# Patient Record
Sex: FEMALE | Race: WHITE | NOT HISPANIC OR LATINO | Employment: OTHER | URBAN - METROPOLITAN AREA
[De-identification: names, ages, dates, MRNs, and addresses within clinical notes are randomized per-mention and may not be internally consistent; named-entity substitution may affect disease eponyms.]

---

## 2024-10-29 ENCOUNTER — APPOINTMENT (OUTPATIENT)
Dept: CARDIOLOGY | Facility: HOSPITAL | Age: 74
End: 2024-10-29
Payer: COMMERCIAL

## 2024-10-29 ENCOUNTER — APPOINTMENT (OUTPATIENT)
Dept: RADIOLOGY | Facility: HOSPITAL | Age: 74
End: 2024-10-29
Payer: COMMERCIAL

## 2024-10-29 ENCOUNTER — HOSPITAL ENCOUNTER (INPATIENT)
Facility: HOSPITAL | Age: 74
LOS: 3 days | Discharge: NURSING, REHAB OR LONG-TERM CARE | End: 2024-11-01
Attending: EMERGENCY MEDICINE | Admitting: INTERNAL MEDICINE
Payer: COMMERCIAL

## 2024-10-29 DIAGNOSIS — N30.00 ACUTE CYSTITIS WITHOUT HEMATURIA: ICD-10-CM

## 2024-10-29 DIAGNOSIS — J18.9 PNEUMONIA OF RIGHT MIDDLE LOBE DUE TO INFECTIOUS ORGANISM: Primary | ICD-10-CM

## 2024-10-29 DIAGNOSIS — G25.81 RESTLESS LEG SYNDROME: ICD-10-CM

## 2024-10-29 DIAGNOSIS — I50.32 CHRONIC DIASTOLIC HEART FAILURE: ICD-10-CM

## 2024-10-29 PROBLEM — J96.21 ACUTE ON CHRONIC RESPIRATORY FAILURE WITH HYPOXIA AND HYPERCAPNIA (MULTI): Status: ACTIVE | Noted: 2024-10-29

## 2024-10-29 PROBLEM — L03.032 CELLULITIS OF TOE OF LEFT FOOT: Status: ACTIVE | Noted: 2024-10-29

## 2024-10-29 PROBLEM — J96.22 ACUTE ON CHRONIC RESPIRATORY FAILURE WITH HYPOXIA AND HYPERCAPNIA (MULTI): Status: ACTIVE | Noted: 2024-10-29

## 2024-10-29 PROBLEM — N39.0 URINARY TRACT INFECTION: Status: ACTIVE | Noted: 2024-10-29

## 2024-10-29 LAB
ALBUMIN SERPL BCP-MCNC: 4 G/DL (ref 3.4–5)
ALP SERPL-CCNC: 85 U/L (ref 33–136)
ALT SERPL W P-5'-P-CCNC: 11 U/L (ref 7–45)
ANION GAP SERPL CALC-SCNC: 11 MMOL/L (ref 10–20)
APPEARANCE UR: ABNORMAL
AST SERPL W P-5'-P-CCNC: 16 U/L (ref 9–39)
ATRIAL RATE: 95 BPM
BACTERIA #/AREA URNS AUTO: ABNORMAL /HPF
BASE EXCESS BLDV CALC-SCNC: 11.4 MMOL/L (ref -2–3)
BASOPHILS # BLD AUTO: 0.03 X10*3/UL (ref 0–0.1)
BASOPHILS NFR BLD AUTO: 0.4 %
BILIRUB SERPL-MCNC: 0.9 MG/DL (ref 0–1.2)
BILIRUB UR STRIP.AUTO-MCNC: NEGATIVE MG/DL
BNP SERPL-MCNC: 267 PG/ML (ref 0–99)
BODY TEMPERATURE: 37 DEGREES CELSIUS
BUN SERPL-MCNC: 25 MG/DL (ref 6–23)
CALCIUM SERPL-MCNC: 10.1 MG/DL (ref 8.6–10.3)
CARDIAC TROPONIN I PNL SERPL HS: 26 NG/L (ref 0–13)
CARDIAC TROPONIN I PNL SERPL HS: 36 NG/L (ref 0–13)
CHLORIDE SERPL-SCNC: 95 MMOL/L (ref 98–107)
CO2 SERPL-SCNC: 39 MMOL/L (ref 21–32)
COLOR UR: ABNORMAL
CREAT SERPL-MCNC: 1.1 MG/DL (ref 0.5–1.05)
CRP SERPL-MCNC: 6.31 MG/DL
EGFRCR SERPLBLD CKD-EPI 2021: 52 ML/MIN/1.73M*2
EOSINOPHIL # BLD AUTO: 0.11 X10*3/UL (ref 0–0.4)
EOSINOPHIL NFR BLD AUTO: 1.6 %
ERYTHROCYTE [DISTWIDTH] IN BLOOD BY AUTOMATED COUNT: 12.2 % (ref 11.5–14.5)
ERYTHROCYTE [SEDIMENTATION RATE] IN BLOOD BY WESTERGREN METHOD: 51 MM/H (ref 0–30)
EST. AVERAGE GLUCOSE BLD GHB EST-MCNC: 108 MG/DL
FLUAV RNA RESP QL NAA+PROBE: NOT DETECTED
FLUBV RNA RESP QL NAA+PROBE: NOT DETECTED
GLUCOSE SERPL-MCNC: 106 MG/DL (ref 74–99)
GLUCOSE UR STRIP.AUTO-MCNC: NORMAL MG/DL
HBA1C MFR BLD: 5.4 %
HCO3 BLDV-SCNC: 40.3 MMOL/L (ref 22–26)
HCT VFR BLD AUTO: 39.1 % (ref 36–46)
HGB BLD-MCNC: 12.3 G/DL (ref 12–16)
HOLD SPECIMEN: NORMAL
IMM GRANULOCYTES # BLD AUTO: 0.03 X10*3/UL (ref 0–0.5)
IMM GRANULOCYTES NFR BLD AUTO: 0.4 % (ref 0–0.9)
INHALED O2 CONCENTRATION: 32 %
KETONES UR STRIP.AUTO-MCNC: NEGATIVE MG/DL
LACTATE SERPL-SCNC: 0.7 MMOL/L (ref 0.4–2)
LEUKOCYTE ESTERASE UR QL STRIP.AUTO: ABNORMAL
LYMPHOCYTES # BLD AUTO: 1.44 X10*3/UL (ref 0.8–3)
LYMPHOCYTES NFR BLD AUTO: 20.8 %
MAGNESIUM SERPL-MCNC: 1.76 MG/DL (ref 1.6–2.4)
MCH RBC QN AUTO: 33.3 PG (ref 26–34)
MCHC RBC AUTO-ENTMCNC: 31.5 G/DL (ref 32–36)
MCV RBC AUTO: 106 FL (ref 80–100)
MONOCYTES # BLD AUTO: 0.53 X10*3/UL (ref 0.05–0.8)
MONOCYTES NFR BLD AUTO: 7.6 %
MRSA DNA SPEC QL NAA+PROBE: NOT DETECTED
MUCOUS THREADS #/AREA URNS AUTO: ABNORMAL /LPF
NEUTROPHILS # BLD AUTO: 4.79 X10*3/UL (ref 1.6–5.5)
NEUTROPHILS NFR BLD AUTO: 69.2 %
NITRITE UR QL STRIP.AUTO: ABNORMAL
NRBC BLD-RTO: 0 /100 WBCS (ref 0–0)
OXYHGB MFR BLDV: 60.1 % (ref 45–75)
P AXIS: 57 DEGREES
PCO2 BLDV: 73 MM HG (ref 41–51)
PH BLDV: 7.35 PH (ref 7.33–7.43)
PH UR STRIP.AUTO: 5.5 [PH]
PLATELET # BLD AUTO: 191 X10*3/UL (ref 150–450)
PO2 BLDV: 39 MM HG (ref 35–45)
POTASSIUM SERPL-SCNC: 4.6 MMOL/L (ref 3.5–5.3)
PR INTERVAL: 190 MS
PROT SERPL-MCNC: 7.7 G/DL (ref 6.4–8.2)
PROT UR STRIP.AUTO-MCNC: NEGATIVE MG/DL
Q ONSET: 249 MS
QRS COUNT: 15 BEATS
QRS DURATION: 79 MS
QT INTERVAL: 342 MS
QTC CALCULATION(BAZETT): 430 MS
QTC FREDERICIA: 398 MS
R AXIS: 24 DEGREES
RBC # BLD AUTO: 3.69 X10*6/UL (ref 4–5.2)
RBC # UR STRIP.AUTO: NEGATIVE /UL
RBC #/AREA URNS AUTO: ABNORMAL /HPF
RSV RNA RESP QL NAA+PROBE: NOT DETECTED
SAO2 % BLDV: 62 % (ref 45–75)
SARS-COV-2 RNA RESP QL NAA+PROBE: NOT DETECTED
SODIUM SERPL-SCNC: 140 MMOL/L (ref 136–145)
SP GR UR STRIP.AUTO: 1.01
SQUAMOUS #/AREA URNS AUTO: ABNORMAL /HPF
T AXIS: 64 DEGREES
T OFFSET: 420 MS
URATE SERPL-MCNC: 9.6 MG/DL (ref 2.3–6.7)
UROBILINOGEN UR STRIP.AUTO-MCNC: NORMAL MG/DL
VENTRICULAR RATE: 95 BPM
WBC # BLD AUTO: 6.9 X10*3/UL (ref 4.4–11.3)
WBC #/AREA URNS AUTO: >50 /HPF
WBC CLUMPS #/AREA URNS AUTO: ABNORMAL /HPF

## 2024-10-29 PROCEDURE — 94667 MNPJ CHEST WALL 1ST: CPT

## 2024-10-29 PROCEDURE — 2500000002 HC RX 250 W HCPCS SELF ADMINISTERED DRUGS (ALT 637 FOR MEDICARE OP, ALT 636 FOR OP/ED): Performed by: STUDENT IN AN ORGANIZED HEALTH CARE EDUCATION/TRAINING PROGRAM

## 2024-10-29 PROCEDURE — 2500000005 HC RX 250 GENERAL PHARMACY W/O HCPCS: Performed by: NURSE PRACTITIONER

## 2024-10-29 PROCEDURE — 99222 1ST HOSP IP/OBS MODERATE 55: CPT | Performed by: NURSE PRACTITIONER

## 2024-10-29 PROCEDURE — 1200000002 HC GENERAL ROOM WITH TELEMETRY DAILY

## 2024-10-29 PROCEDURE — 87086 URINE CULTURE/COLONY COUNT: CPT | Mod: PORLAB | Performed by: NURSE PRACTITIONER

## 2024-10-29 PROCEDURE — 94640 AIRWAY INHALATION TREATMENT: CPT

## 2024-10-29 PROCEDURE — 85652 RBC SED RATE AUTOMATED: CPT | Performed by: NURSE PRACTITIONER

## 2024-10-29 PROCEDURE — 71275 CT ANGIOGRAPHY CHEST: CPT

## 2024-10-29 PROCEDURE — 83036 HEMOGLOBIN GLYCOSYLATED A1C: CPT | Performed by: NURSE PRACTITIONER

## 2024-10-29 PROCEDURE — 84145 PROCALCITONIN (PCT): CPT | Mod: PORLAB | Performed by: NURSE PRACTITIONER

## 2024-10-29 PROCEDURE — 71275 CT ANGIOGRAPHY CHEST: CPT | Mod: FOREIGN READ | Performed by: RADIOLOGY

## 2024-10-29 PROCEDURE — 73630 X-RAY EXAM OF FOOT: CPT | Mod: LEFT SIDE | Performed by: RADIOLOGY

## 2024-10-29 PROCEDURE — 73630 X-RAY EXAM OF FOOT: CPT | Mod: LT

## 2024-10-29 PROCEDURE — 87040 BLOOD CULTURE FOR BACTERIA: CPT | Mod: PORLAB | Performed by: NURSE PRACTITIONER

## 2024-10-29 PROCEDURE — 36415 COLL VENOUS BLD VENIPUNCTURE: CPT | Performed by: NURSE PRACTITIONER

## 2024-10-29 PROCEDURE — 96375 TX/PRO/DX INJ NEW DRUG ADDON: CPT

## 2024-10-29 PROCEDURE — 83605 ASSAY OF LACTIC ACID: CPT | Performed by: NURSE PRACTITIONER

## 2024-10-29 PROCEDURE — 96367 TX/PROPH/DG ADDL SEQ IV INF: CPT

## 2024-10-29 PROCEDURE — 84484 ASSAY OF TROPONIN QUANT: CPT | Performed by: NURSE PRACTITIONER

## 2024-10-29 PROCEDURE — 84550 ASSAY OF BLOOD/URIC ACID: CPT | Performed by: NURSE PRACTITIONER

## 2024-10-29 PROCEDURE — 99223 1ST HOSP IP/OBS HIGH 75: CPT | Performed by: NURSE PRACTITIONER

## 2024-10-29 PROCEDURE — 83735 ASSAY OF MAGNESIUM: CPT | Performed by: NURSE PRACTITIONER

## 2024-10-29 PROCEDURE — 87637 SARSCOV2&INF A&B&RSV AMP PRB: CPT | Performed by: STUDENT IN AN ORGANIZED HEALTH CARE EDUCATION/TRAINING PROGRAM

## 2024-10-29 PROCEDURE — 93005 ELECTROCARDIOGRAM TRACING: CPT

## 2024-10-29 PROCEDURE — 99285 EMERGENCY DEPT VISIT HI MDM: CPT | Mod: 25

## 2024-10-29 PROCEDURE — 87641 MR-STAPH DNA AMP PROBE: CPT | Performed by: NURSE PRACTITIONER

## 2024-10-29 PROCEDURE — 99291 CRITICAL CARE FIRST HOUR: CPT

## 2024-10-29 PROCEDURE — 81001 URINALYSIS AUTO W/SCOPE: CPT | Performed by: NURSE PRACTITIONER

## 2024-10-29 PROCEDURE — 82805 BLOOD GASES W/O2 SATURATION: CPT | Performed by: EMERGENCY MEDICINE

## 2024-10-29 PROCEDURE — 80053 COMPREHEN METABOLIC PANEL: CPT | Performed by: NURSE PRACTITIONER

## 2024-10-29 PROCEDURE — 2550000001 HC RX 255 CONTRASTS: Performed by: NURSE PRACTITIONER

## 2024-10-29 PROCEDURE — 86140 C-REACTIVE PROTEIN: CPT | Performed by: NURSE PRACTITIONER

## 2024-10-29 PROCEDURE — 87449 NOS EACH ORGANISM AG IA: CPT | Mod: PORLAB | Performed by: NURSE PRACTITIONER

## 2024-10-29 PROCEDURE — 2500000004 HC RX 250 GENERAL PHARMACY W/ HCPCS (ALT 636 FOR OP/ED): Performed by: NURSE PRACTITIONER

## 2024-10-29 PROCEDURE — 2500000001 HC RX 250 WO HCPCS SELF ADMINISTERED DRUGS (ALT 637 FOR MEDICARE OP): Performed by: STUDENT IN AN ORGANIZED HEALTH CARE EDUCATION/TRAINING PROGRAM

## 2024-10-29 PROCEDURE — 96365 THER/PROPH/DIAG IV INF INIT: CPT

## 2024-10-29 PROCEDURE — 83880 ASSAY OF NATRIURETIC PEPTIDE: CPT | Performed by: NURSE PRACTITIONER

## 2024-10-29 PROCEDURE — 85025 COMPLETE CBC W/AUTO DIFF WBC: CPT | Performed by: NURSE PRACTITIONER

## 2024-10-29 PROCEDURE — 87899 AGENT NOS ASSAY W/OPTIC: CPT | Mod: PORLAB | Performed by: NURSE PRACTITIONER

## 2024-10-29 PROCEDURE — 2500000002 HC RX 250 W HCPCS SELF ADMINISTERED DRUGS (ALT 637 FOR MEDICARE OP, ALT 636 FOR OP/ED): Performed by: NURSE PRACTITIONER

## 2024-10-29 RX ORDER — IPRATROPIUM BROMIDE AND ALBUTEROL SULFATE 2.5; .5 MG/3ML; MG/3ML
3 SOLUTION RESPIRATORY (INHALATION) 3 TIMES DAILY PRN
Status: DISCONTINUED | OUTPATIENT
Start: 2024-10-29 | End: 2024-11-01 | Stop reason: HOSPADM

## 2024-10-29 RX ORDER — FERROUS SULFATE 325(65) MG
65 TABLET, DELAYED RELEASE (ENTERIC COATED) ORAL 2 TIMES DAILY
COMMUNITY

## 2024-10-29 RX ORDER — IPRATROPIUM BROMIDE AND ALBUTEROL SULFATE 2.5; .5 MG/3ML; MG/3ML
3 SOLUTION RESPIRATORY (INHALATION)
Status: DISCONTINUED | OUTPATIENT
Start: 2024-10-29 | End: 2024-10-29

## 2024-10-29 RX ORDER — ACETAMINOPHEN 500 MG
5000 TABLET ORAL DAILY
COMMUNITY

## 2024-10-29 RX ORDER — IPRATROPIUM BROMIDE AND ALBUTEROL SULFATE 2.5; .5 MG/3ML; MG/3ML
3 SOLUTION RESPIRATORY (INHALATION) EVERY 4 HOURS PRN
Status: DISCONTINUED | OUTPATIENT
Start: 2024-10-29 | End: 2024-10-29

## 2024-10-29 RX ORDER — METOPROLOL SUCCINATE 50 MG/1
50 TABLET, EXTENDED RELEASE ORAL DAILY
Status: DISCONTINUED | OUTPATIENT
Start: 2024-10-30 | End: 2024-11-01 | Stop reason: HOSPADM

## 2024-10-29 RX ORDER — CEFTRIAXONE 2 G/50ML
2 INJECTION, SOLUTION INTRAVENOUS EVERY 24 HOURS
Status: DISCONTINUED | OUTPATIENT
Start: 2024-10-30 | End: 2024-11-01 | Stop reason: HOSPADM

## 2024-10-29 RX ORDER — GABAPENTIN 400 MG/1
400 CAPSULE ORAL 3 TIMES DAILY
Status: DISCONTINUED | OUTPATIENT
Start: 2024-10-29 | End: 2024-11-01 | Stop reason: HOSPADM

## 2024-10-29 RX ORDER — CRANBERRY FRUIT 450 MG
1 TABLET ORAL DAILY
COMMUNITY

## 2024-10-29 RX ORDER — ONDANSETRON 4 MG/1
4 TABLET, ORALLY DISINTEGRATING ORAL EVERY 8 HOURS PRN
COMMUNITY

## 2024-10-29 RX ORDER — FOLIC ACID 1 MG/1
1 TABLET ORAL DAILY
COMMUNITY

## 2024-10-29 RX ORDER — LIDOCAINE 560 MG/1
1 PATCH PERCUTANEOUS; TOPICAL; TRANSDERMAL DAILY
Status: DISCONTINUED | OUTPATIENT
Start: 2024-10-30 | End: 2024-11-01 | Stop reason: HOSPADM

## 2024-10-29 RX ORDER — GUAIFENESIN 600 MG/1
600 TABLET, EXTENDED RELEASE ORAL EVERY 12 HOURS PRN
Status: DISCONTINUED | OUTPATIENT
Start: 2024-10-29 | End: 2024-11-01 | Stop reason: HOSPADM

## 2024-10-29 RX ORDER — FOLIC ACID 1 MG/1
1 TABLET ORAL DAILY
Status: DISCONTINUED | OUTPATIENT
Start: 2024-10-30 | End: 2024-11-01 | Stop reason: HOSPADM

## 2024-10-29 RX ORDER — POLYETHYLENE GLYCOL 3350 17 G/17G
17 POWDER, FOR SOLUTION ORAL DAILY PRN
COMMUNITY

## 2024-10-29 RX ORDER — ONDANSETRON 4 MG/1
4 TABLET, FILM COATED ORAL EVERY 8 HOURS PRN
Status: DISCONTINUED | OUTPATIENT
Start: 2024-10-29 | End: 2024-11-01 | Stop reason: HOSPADM

## 2024-10-29 RX ORDER — IPRATROPIUM BROMIDE AND ALBUTEROL SULFATE 2.5; .5 MG/3ML; MG/3ML
3 SOLUTION RESPIRATORY (INHALATION) ONCE
Status: COMPLETED | OUTPATIENT
Start: 2024-10-29 | End: 2024-10-29

## 2024-10-29 RX ORDER — BIOTIN 1 MG
1000 TABLET ORAL DAILY
COMMUNITY

## 2024-10-29 RX ORDER — AMITRIPTYLINE HYDROCHLORIDE 25 MG/1
25 TABLET, FILM COATED ORAL NIGHTLY
Status: DISCONTINUED | OUTPATIENT
Start: 2024-10-29 | End: 2024-11-01 | Stop reason: HOSPADM

## 2024-10-29 RX ORDER — GABAPENTIN 400 MG/1
400 CAPSULE ORAL 3 TIMES DAILY
COMMUNITY

## 2024-10-29 RX ORDER — DOCUSATE SODIUM 100 MG/1
100 CAPSULE, LIQUID FILLED ORAL DAILY
COMMUNITY

## 2024-10-29 RX ORDER — POLYETHYLENE GLYCOL 3350 17 G/17G
17 POWDER, FOR SOLUTION ORAL DAILY
Status: DISCONTINUED | OUTPATIENT
Start: 2024-10-29 | End: 2024-11-01 | Stop reason: HOSPADM

## 2024-10-29 RX ORDER — TALC
3 POWDER (GRAM) TOPICAL NIGHTLY PRN
Status: DISCONTINUED | OUTPATIENT
Start: 2024-10-29 | End: 2024-11-01 | Stop reason: HOSPADM

## 2024-10-29 RX ORDER — FUROSEMIDE 20 MG/1
20 TABLET ORAL DAILY
Status: DISCONTINUED | OUTPATIENT
Start: 2024-10-30 | End: 2024-11-01 | Stop reason: HOSPADM

## 2024-10-29 RX ORDER — BISACODYL 10 MG/1
10 SUPPOSITORY RECTAL DAILY PRN
Status: DISCONTINUED | OUTPATIENT
Start: 2024-10-29 | End: 2024-11-01 | Stop reason: HOSPADM

## 2024-10-29 RX ORDER — BISACODYL 5 MG
10 TABLET, DELAYED RELEASE (ENTERIC COATED) ORAL DAILY PRN
Status: DISCONTINUED | OUTPATIENT
Start: 2024-10-29 | End: 2024-11-01 | Stop reason: HOSPADM

## 2024-10-29 RX ORDER — FUROSEMIDE 20 MG/1
20 TABLET ORAL DAILY
COMMUNITY

## 2024-10-29 RX ORDER — CEFTRIAXONE 2 G/50ML
2 INJECTION, SOLUTION INTRAVENOUS ONCE
Status: COMPLETED | OUTPATIENT
Start: 2024-10-29 | End: 2024-10-29

## 2024-10-29 RX ORDER — AMITRIPTYLINE HYDROCHLORIDE 25 MG/1
25 TABLET, FILM COATED ORAL NIGHTLY
COMMUNITY

## 2024-10-29 RX ORDER — LIDOCAINE 560 MG/1
1 PATCH PERCUTANEOUS; TOPICAL; TRANSDERMAL DAILY
COMMUNITY

## 2024-10-29 RX ORDER — IPRATROPIUM BROMIDE AND ALBUTEROL SULFATE 2.5; .5 MG/3ML; MG/3ML
3 SOLUTION RESPIRATORY (INHALATION) 3 TIMES DAILY PRN
COMMUNITY

## 2024-10-29 RX ORDER — PANTOPRAZOLE SODIUM 40 MG/1
40 TABLET, DELAYED RELEASE ORAL
Status: DISCONTINUED | OUTPATIENT
Start: 2024-10-30 | End: 2024-11-01 | Stop reason: HOSPADM

## 2024-10-29 RX ORDER — ONDANSETRON HYDROCHLORIDE 2 MG/ML
4 INJECTION, SOLUTION INTRAVENOUS EVERY 8 HOURS PRN
Status: DISCONTINUED | OUTPATIENT
Start: 2024-10-29 | End: 2024-11-01 | Stop reason: HOSPADM

## 2024-10-29 RX ORDER — HEPARIN SODIUM 5000 [USP'U]/ML
7500 INJECTION, SOLUTION INTRAVENOUS; SUBCUTANEOUS EVERY 8 HOURS SCHEDULED
Status: DISCONTINUED | OUTPATIENT
Start: 2024-10-29 | End: 2024-11-01 | Stop reason: HOSPADM

## 2024-10-29 RX ORDER — ACETAMINOPHEN 500 MG
1000 TABLET ORAL 3 TIMES DAILY
COMMUNITY

## 2024-10-29 RX ORDER — PANTOPRAZOLE SODIUM 40 MG/10ML
40 INJECTION, POWDER, LYOPHILIZED, FOR SOLUTION INTRAVENOUS
Status: DISCONTINUED | OUTPATIENT
Start: 2024-10-30 | End: 2024-11-01 | Stop reason: HOSPADM

## 2024-10-29 RX ORDER — METOPROLOL SUCCINATE 50 MG/1
50 TABLET, EXTENDED RELEASE ORAL DAILY
COMMUNITY

## 2024-10-29 RX ORDER — VANCOMYCIN HYDROCHLORIDE 1 G/200ML
1000 INJECTION, SOLUTION INTRAVENOUS EVERY 12 HOURS
Status: DISCONTINUED | OUTPATIENT
Start: 2024-10-30 | End: 2024-10-29

## 2024-10-29 SDOH — ECONOMIC STABILITY: FOOD INSECURITY: WITHIN THE PAST 12 MONTHS, YOU WORRIED THAT YOUR FOOD WOULD RUN OUT BEFORE YOU GOT THE MONEY TO BUY MORE.: NEVER TRUE

## 2024-10-29 SDOH — ECONOMIC STABILITY: HOUSING INSECURITY: IN THE LAST 12 MONTHS, WAS THERE A TIME WHEN YOU WERE NOT ABLE TO PAY THE MORTGAGE OR RENT ON TIME?: NO

## 2024-10-29 SDOH — ECONOMIC STABILITY: FOOD INSECURITY: WITHIN THE PAST 12 MONTHS, THE FOOD YOU BOUGHT JUST DIDN'T LAST AND YOU DIDN'T HAVE MONEY TO GET MORE.: NEVER TRUE

## 2024-10-29 SDOH — SOCIAL STABILITY: SOCIAL INSECURITY
WITHIN THE LAST YEAR, HAVE YOU BEEN RAPED OR FORCED TO HAVE ANY KIND OF SEXUAL ACTIVITY BY YOUR PARTNER OR EX-PARTNER?: NO

## 2024-10-29 SDOH — ECONOMIC STABILITY: HOUSING INSECURITY: AT ANY TIME IN THE PAST 12 MONTHS, WERE YOU HOMELESS OR LIVING IN A SHELTER (INCLUDING NOW)?: NO

## 2024-10-29 SDOH — ECONOMIC STABILITY: INCOME INSECURITY: IN THE PAST 12 MONTHS HAS THE ELECTRIC, GAS, OIL, OR WATER COMPANY THREATENED TO SHUT OFF SERVICES IN YOUR HOME?: NO

## 2024-10-29 SDOH — SOCIAL STABILITY: SOCIAL INSECURITY
WITHIN THE LAST YEAR, HAVE YOU BEEN KICKED, HIT, SLAPPED, OR OTHERWISE PHYSICALLY HURT BY YOUR PARTNER OR EX-PARTNER?: NO

## 2024-10-29 SDOH — HEALTH STABILITY: PHYSICAL HEALTH: ON AVERAGE, HOW MANY DAYS PER WEEK DO YOU ENGAGE IN MODERATE TO STRENUOUS EXERCISE (LIKE A BRISK WALK)?: 0 DAYS

## 2024-10-29 SDOH — ECONOMIC STABILITY: TRANSPORTATION INSECURITY: IN THE PAST 12 MONTHS, HAS LACK OF TRANSPORTATION KEPT YOU FROM MEDICAL APPOINTMENTS OR FROM GETTING MEDICATIONS?: NO

## 2024-10-29 SDOH — HEALTH STABILITY: PHYSICAL HEALTH: ON AVERAGE, HOW MANY MINUTES DO YOU ENGAGE IN EXERCISE AT THIS LEVEL?: 0 MIN

## 2024-10-29 SDOH — ECONOMIC STABILITY: HOUSING INSECURITY: IN THE PAST 12 MONTHS, HOW MANY TIMES HAVE YOU MOVED WHERE YOU WERE LIVING?: 1

## 2024-10-29 SDOH — ECONOMIC STABILITY: FOOD INSECURITY: HOW HARD IS IT FOR YOU TO PAY FOR THE VERY BASICS LIKE FOOD, HOUSING, MEDICAL CARE, AND HEATING?: NOT HARD AT ALL

## 2024-10-29 SDOH — SOCIAL STABILITY: SOCIAL INSECURITY: WERE YOU ABLE TO COMPLETE ALL THE BEHAVIORAL HEALTH SCREENINGS?: YES

## 2024-10-29 SDOH — SOCIAL STABILITY: SOCIAL INSECURITY: WITHIN THE LAST YEAR, HAVE YOU BEEN AFRAID OF YOUR PARTNER OR EX-PARTNER?: NO

## 2024-10-29 SDOH — SOCIAL STABILITY: SOCIAL INSECURITY: WITHIN THE LAST YEAR, HAVE YOU BEEN HUMILIATED OR EMOTIONALLY ABUSED IN OTHER WAYS BY YOUR PARTNER OR EX-PARTNER?: NO

## 2024-10-29 SDOH — SOCIAL STABILITY: SOCIAL INSECURITY: HAVE YOU HAD THOUGHTS OF HARMING ANYONE ELSE?: NO

## 2024-10-29 ASSESSMENT — ACTIVITIES OF DAILY LIVING (ADL)
TOILETING: NEEDS ASSISTANCE
LACK_OF_TRANSPORTATION: NO
ADEQUATE_TO_COMPLETE_ADL: YES
HEARING - RIGHT EAR: FUNCTIONAL
ASSISTIVE_DEVICE: WHEELCHAIR;OTHER (COMMENT)
WALKS IN HOME: DEPENDENT
HEARING - LEFT EAR: FUNCTIONAL
GROOMING: INDEPENDENT
DRESSING YOURSELF: NEEDS ASSISTANCE
JUDGMENT_ADEQUATE_SAFELY_COMPLETE_DAILY_ACTIVITIES: YES
LACK_OF_TRANSPORTATION: NO
FEEDING YOURSELF: INDEPENDENT
BATHING: NEEDS ASSISTANCE
PATIENT'S MEMORY ADEQUATE TO SAFELY COMPLETE DAILY ACTIVITIES?: YES

## 2024-10-29 ASSESSMENT — COGNITIVE AND FUNCTIONAL STATUS - GENERAL
MOVING TO AND FROM BED TO CHAIR: TOTAL
WALKING IN HOSPITAL ROOM: TOTAL
MOVING FROM LYING ON BACK TO SITTING ON SIDE OF FLAT BED WITH BEDRAILS: A LOT
DRESSING REGULAR LOWER BODY CLOTHING: A LOT
DRESSING REGULAR UPPER BODY CLOTHING: A LOT
CLIMB 3 TO 5 STEPS WITH RAILING: TOTAL
TURNING FROM BACK TO SIDE WHILE IN FLAT BAD: A LOT
MOVING TO AND FROM BED TO CHAIR: TOTAL
HELP NEEDED FOR BATHING: A LOT
STANDING UP FROM CHAIR USING ARMS: TOTAL
MOVING FROM LYING ON BACK TO SITTING ON SIDE OF FLAT BED WITH BEDRAILS: A LOT
CLIMB 3 TO 5 STEPS WITH RAILING: TOTAL
TURNING FROM BACK TO SIDE WHILE IN FLAT BAD: A LOT
DRESSING REGULAR UPPER BODY CLOTHING: A LOT
MOBILITY SCORE: 8
TOILETING: A LOT
HELP NEEDED FOR BATHING: A LOT
DAILY ACTIVITIY SCORE: 16
DAILY ACTIVITIY SCORE: 16
WALKING IN HOSPITAL ROOM: TOTAL
MOBILITY SCORE: 8
PATIENT BASELINE BEDBOUND: NO
STANDING UP FROM CHAIR USING ARMS: TOTAL
TOILETING: A LOT
DRESSING REGULAR LOWER BODY CLOTHING: A LOT

## 2024-10-29 ASSESSMENT — PAIN - FUNCTIONAL ASSESSMENT: PAIN_FUNCTIONAL_ASSESSMENT: 0-10

## 2024-10-29 ASSESSMENT — LIFESTYLE VARIABLES
TOTAL SCORE: 0
HAVE YOU EVER FELT YOU SHOULD CUT DOWN ON YOUR DRINKING: NO
SUBSTANCE_ABUSE_PAST_12_MONTHS: NO
EVER FELT BAD OR GUILTY ABOUT YOUR DRINKING: NO
PRESCIPTION_ABUSE_PAST_12_MONTHS: NO
SKIP TO QUESTIONS 9-10: 1
AUDIT-C TOTAL SCORE: 0
EVER HAD A DRINK FIRST THING IN THE MORNING TO STEADY YOUR NERVES TO GET RID OF A HANGOVER: NO
AUDIT-C TOTAL SCORE: 0
HOW OFTEN DO YOU HAVE A DRINK CONTAINING ALCOHOL: NEVER
HOW OFTEN DO YOU HAVE 6 OR MORE DRINKS ON ONE OCCASION: NEVER
HOW MANY STANDARD DRINKS CONTAINING ALCOHOL DO YOU HAVE ON A TYPICAL DAY: PATIENT DOES NOT DRINK
HAVE PEOPLE ANNOYED YOU BY CRITICIZING YOUR DRINKING: NO

## 2024-10-29 ASSESSMENT — PATIENT HEALTH QUESTIONNAIRE - PHQ9
2. FEELING DOWN, DEPRESSED OR HOPELESS: NOT AT ALL
SUM OF ALL RESPONSES TO PHQ9 QUESTIONS 1 & 2: 0
1. LITTLE INTEREST OR PLEASURE IN DOING THINGS: NOT AT ALL

## 2024-10-29 ASSESSMENT — PAIN SCALES - GENERAL
PAINLEVEL_OUTOF10: 0 - NO PAIN
PAINLEVEL_OUTOF10: 0 - NO PAIN

## 2024-10-29 ASSESSMENT — COLUMBIA-SUICIDE SEVERITY RATING SCALE - C-SSRS
2. HAVE YOU ACTUALLY HAD ANY THOUGHTS OF KILLING YOURSELF?: NO
1. IN THE PAST MONTH, HAVE YOU WISHED YOU WERE DEAD OR WISHED YOU COULD GO TO SLEEP AND NOT WAKE UP?: NO
6. HAVE YOU EVER DONE ANYTHING, STARTED TO DO ANYTHING, OR PREPARED TO DO ANYTHING TO END YOUR LIFE?: NO

## 2024-10-29 NOTE — ED TRIAGE NOTES
Pt had pneumonia and finished doxycyline on the 27th for 5-7 days. [facility] took an XRAY this morning that looked like she has atelectasis on the right lower lobe and patient was complaining of shortness of breath. Patient has history of COPD and wears 3.5 liters of oxygen

## 2024-10-29 NOTE — PROGRESS NOTES
Vancomycin Dosing by Pharmacy- INITIAL    Diane Herzog is a 74 y.o. year old female who Pharmacy has been consulted for vancomycin dosing for pneumonia. Based on the patient's indication and renal status this patient will be dosed based on a goal AUC of 500-600.     Renal function is currently stable.    Visit Vitals  BP (!) 136/94   Pulse 91   Temp 36.6 °C (97.8 °F) (Temporal)   Resp 19        Lab Results   Component Value Date    CREATININE 1.10 (H) 10/29/2024    CREATININE 1.07 (H) 2023    CREATININE 1.10 (H) 2023    CREATININE 1.34 (H) 2023    CREATININE 1.63 (H) 2023        Patient weight is as follows:   Vitals:    10/29/24 0902   Weight: 136 kg (300 lb)       Cultures:  No results found for the encounter in last 14 days.        No intake/output data recorded.  I/O during current shift:  No intake/output data recorded.    Temp (24hrs), Av.6 °C (97.8 °F), Min:36.6 °C (97.8 °F), Max:36.6 °C (97.8 °F)         Assessment/Plan     Patient has already been given a loading dose of 2000 mg.  Will initiate vancomycin maintenance,  1000 mg every 12 hours.    This dosing regimen is predicted by [employer] to result in the following pharmacokinetic parameters:  Regimen: 1000 mg IV every 12 hours.  Start time: 01:04 on 10/30/2024  Exposure target: AUC24 (range)400-600 mg/L.hr   ROY59-13: 568 mg/L.hr  AUC24,ss: 571 mg/L.hr  Probability of AUC24 > 400: 75 %  Ctrough,ss: 17.3 mg/L  Probability of Ctrough,ss > 20: 42 %      Follow-up level will be ordered on 10/30 at 5:00 unless clinically indicated sooner.  Will continue to monitor renal function daily while on vancomycin and order serum creatinine at least every 48 hours if not already ordered.  Follow for continued vancomycin needs, clinical response, and signs/symptoms of toxicity.       Noel Cole, PharmD

## 2024-10-29 NOTE — CONSULTS
Inpatient consult to Infectious Diseases  Consult performed by: Gael Ya MD  Consult ordered by: Cristofer Felder, APRN-CNP        Primary MD: No primary care provider on file.    Reason For Consult  Possible pneumonia      Assessment/Plan:    #L great toe cellulitis  Patient recently had her nails cut by the podiatrist at the facility few weeks ago.  Patient is not sure of ended the infection start as she has altered sensation in her feet due to neuropathy.    #Acute on chronic hypoxic respiratory failure   Patient initially was on 2 days of oxygen about a week ago and now currently on 3 L.  Bibasilar atelectasis seen on the CT chest.  No focal consolidation.  Patient afebrile with normal WBC count.  Patient became hypoxic at the facility but in the hospital again on 3 L.  Will check RSV, COVID, flu.    HTN  chronic hypoxic respiratory failure on 3 L NC  pulmonary hypertension   HFpEF  RLS  essential tremor    Recommendations:    -DC zosyn and rocephin  -Start rocephin 2g q24h  -Check COVID, flu, RSV  -Obtain L foot xray  -Encourage incentive spirometer  -Thank you for consult. Will cont to follow    Gael Ya MD  Date of service: 10/29/2024  Time of service: 5:50 PM    History Of Present Illness  Diane Herzog is a 74 y.o. female with PMHx of HTN, chronic hypoxic respiratory failure on 3 L NC, SVT, pulmonary hypertension and HFpEF , RLS, essential tremor who was brought into the hospital for evaluation of hypoxia.  Patient was recently diagnosed with pneumonia and was on doxycycline which patient finished yesterday.  States she was feeling at her baseline when one of the nurses got concerned because of the change of color of her lips and was found to be hypoxic in 80s, EMS was called and patient was brought into the hospital for further evaluation.  Patient previously was on 2 L oxygen but since last 7 days, has been on 3 L.    On admission, vital stable except hypoxia.  Labs show normal WBC count.  CT  chest did not show any focal consolidation but did show bibasilar atelectasis, hepatic steatosis with some concern for underlying cirrhosis.  Patient was given a dose of ceftriaxone and was then started on Zosyn and vancomycin.      Patient currently on 3 L of oxygen again.  States she feels back to her baseline.  ID consulted for further antibiotic recommendation     Past Medical History  She has no past medical history on file.    Surgical History  She has a past surgical history that includes Other surgical history (06/07/2022); Other surgical history (06/07/2022); Other surgical history (06/07/2022); and Other surgical history (06/07/2022).     Social History     Occupational History    Not on file   Tobacco Use    Smoking status: Never    Smokeless tobacco: Never   Substance and Sexual Activity    Alcohol use: Not on file    Drug use: Not on file    Sexual activity: Not on file     Travel History   Travel since 09/29/24    No documented travel since 09/29/24              Family History  No family history on file.  Allergies  Patient has no known allergies.     Immunization History   Administered Date(s) Administered    Moderna SARS-CoV-2 Vaccination 02/12/2021, 03/12/2021, 10/29/2021, 07/05/2022     Medications  Home medications:  No medications prior to admission.     Current medications:  Scheduled medications  heparin (porcine), 7,500 Units, subcutaneous, q8h PALOMA  ipratropium-albuteroL, 3 mL, nebulization, q6h  oxygen, , inhalation, Continuous - Inhalation  [START ON 10/30/2024] pantoprazole, 40 mg, oral, Daily before breakfast   Or  [START ON 10/30/2024] pantoprazole, 40 mg, intravenous, Daily before breakfast  piperacillin-tazobactam, 4.5 g, intravenous, q6h  polyethylene glycol, 17 g, oral, Daily      Continuous medications     PRN medications  PRN medications: bisacodyl, bisacodyl, guaiFENesin, melatonin, ondansetron **OR** ondansetron    Review of Systems     Objective  Range of Vitals (last 24  hours)  Heart Rate:  []   Temp:  [36.6 °C (97.8 °F)]   Resp:  [15-20]   BP: (107-146)/()   Height:  [152.4 cm (5')]   Weight:  [136 kg (300 lb)]   SpO2:  [93 %-100 %]   Daily Weight  10/29/24 : 136 kg (300 lb)    Body mass index is 58.59 kg/m².     Constitutional:  Denies appetite change, chills, fatigue, fever.  HENT:  Denies ear discharge, ear pain, sore throat    Eyes:  Denies photophobia, eye drainage  Respiratory:  Denies cough, choking, chest tightness, shortness of breath  Cardiovascular:  Denies chest pain, palpitations  Gastrointestinal:  Denies abdominal pain, diarrhea, nausea and vomiting.   Genitourinary: Reports incontinence  Musculoskeletal:  Denies joint pain  Neurological:  Denies light-headedness, numbness and headaches.    Physical Exam     General: alert, oriented, NAD  HEENT: No conjunctival pallor, no scleral icterus  Neck: No LAD, No JVD  Lungs: bilaterally clear to auscultation, no wheezing  Heart: regular rate and rhythm, no murmur  Abdomen: soft, non tender, non distended, BS+  Neuro: AAO x 3, PERRL, CN grossly intact, b/l ue essential tremor  Extremities: L great toe erythema with warmth  Skin: as above         Relevant Results  Outside Hospital Results    Labs  Results from last 72 hours   Lab Units 10/29/24  1008   WBC AUTO x10*3/uL 6.9   HEMOGLOBIN g/dL 12.3   HEMATOCRIT % 39.1   PLATELETS AUTO x10*3/uL 191   NEUTROS PCT AUTO % 69.2   LYMPHS PCT AUTO % 20.8   MONOS PCT AUTO % 7.6   EOS PCT AUTO % 1.6     Results from last 72 hours   Lab Units 10/29/24  1008   SODIUM mmol/L 140   POTASSIUM mmol/L 4.6   CHLORIDE mmol/L 95*   CO2 mmol/L 39*   BUN mg/dL 25*   CREATININE mg/dL 1.10*   GLUCOSE mg/dL 106*   CALCIUM mg/dL 10.1   ANION GAP mmol/L 11   EGFR mL/min/1.73m*2 52*     Results from last 72 hours   Lab Units 10/29/24  1008   ALK PHOS U/L 85   BILIRUBIN TOTAL mg/dL 0.9   PROTEIN TOTAL g/dL 7.7   ALT U/L 11   AST U/L 16   ALBUMIN g/dL 4.0     Imaging    XR foot left 3+  views    Result Date: 10/29/2024  STUDY: Foot Radiographs; 10/29/2024 2:03 PM INDICATION: Great toe pain.  Evaluate for osteomyelitis. COMPARISON: None Available. ACCESSION NUMBER(S): [id number] ORDERING CLINICIAN: DAVE SUN TECHNIQUE:  Three view(s) of the left foot. FINDINGS:  There is no displaced fracture.  The alignment is anatomic.  No soft tissue abnormality is seen. No discrete lytic or blastic lesion is noted. There are diffuse degenerative changes. There is a small heel spur.    No discrete acute osseous abnormality. Extensive diffuse degenerative changes and diffuse osteopenia. Signed by Brock Montalvo MD    CT angio chest for pulmonary embolism    Result Date: 10/29/2024  STUDY: CT Angiogram of the Chest; 10/29/2024 11:18 AM INDICATION: Hypoxia, tachycardia. COMPARISON: None Available. ACCESSION NUMBER(S): [id number] ORDERING CLINICIAN: GENNARO ROMERO TECHNIQUE:  CTA of the chest was performed with intravenous contrast. Images are reviewed and processed at a workstation according to the CT angiogram protocol with 3-D and/or MIP post processing imaging generated.  Omnipaque 350--75 mL was administered intravenously. Automated mA/kV exposure control was utilized and patient examination was performed in strict accordance with principles of ALARA. FINDINGS: Pulmonary arteries are slightly suboptimally opacified due to late contrast bolus, but no large or central pulmonary embolism.  The thoracic aorta is normal in course and caliber without dissection or aneurysm.  Main pulmonary artery is dilated measuring 3.8 cm. The heart is normal in size without pericardial effusion.  Moderate coronary artery calcifications.  Thoracic lymph nodes are not enlarged. And small right pleural effusion.  The airways are patent. Bibasilar areas of atelectasis.  Question superimposed areas of consolidation in the right lower lobe and possibly in the right middle lobe. There is fatty infiltration of the liver with  slight nodular contour the liver raising the possibility of underlying cirrhosis. Gallbladder is present. There are no acute fractures.  No suspicious bony lesions.    1. Pulmonary arteries are slightly suboptimally opacified due to late contrast bolus, but no large or central pulmonary embolism. 2. Small right pleural effusion. 3. Bibasilar areas of atelectasis. Question superimposed areas of consolidation in the right lower lobe and possibly in the right middle lobe. Correlate clinically for possible pneumonia. 4. Main pulmonary artery is dilated measuring 3.8 cm. Findings suggestive of pulmonary arterial hypertension. 5. Hepatic steatosis with some morphologic changes raising the possibility of underlying cirrhosis. Signed by Shakir Hutchinson MD    ECG 12 lead    Result Date: 10/29/2024  Sinus rhythm Ventricular premature complex Low voltage, precordial leads

## 2024-10-29 NOTE — PROGRESS NOTES
Diane Herzog is a 74 y.o. female admitted for Pneumonia of right middle lobe due to infectious organism. Pharmacy reviewed the patient's [id number] medications and allergies for accuracy.    The list below reflects the PTA list prior to pharmacy medication history. A summary a changes to the PTA medication list has been listed below. Please review each medication in order reconciliation for additional clarification and justification.    Source of information: T2P     Medications added:  BIOTIN 1000MCG - 1 every day   METOPROL SUCCINATE 50MG - 1 every day   VITAMIN D3 5,000UNITS - 1 every day   LASIX 20MG - 1 every day   Lidocaine 4% patch - NATALIE TO LEFT SHOULDER every day REMOVE AFTER 12 H   TYLENOL 500MG - 2 T TID   ELAVIL 25MG - 1 at bedtime   FERROUS SULFATE 325MG 1 BID   CRANBERRY 450MG - 1 every day   FOLIC ACID 1MG - 1 every day   GABAPENTIN 400MG - 1 TID   COLACE 100MG - 1 every day   ZOFRAN ODT 4MG - 1 Q 8 H PRN NAUSEA OR VOMITING   MIRALAX 17G - 17G every day PRN CONSTIPATION   DUO-NEB SOLUTION - 3ML TID PRN SOB       Medications modified:    Medications to be removed:    Medications of concern:      None       ZANDRA VILLALOBOS

## 2024-10-29 NOTE — CONSULTS
Inpatient consult to Pulmonology  Consult performed by: Desire Montero, BON-CNP  Consult ordered by: Cristofer Felder, BON-CNP        Reason For Consult  Acute on chronic hypoxic respiratory failure     History Of Present Illness  Diane Herzog is a 74 y.o. female with a PMHx of HTN, chronic hypoxic respiratory failure on 2 L NC, SVT, pulmonary hypertension and HFpEF. Admitted 10/29/24 from nursing facility after found to have AMS and hypoxia (79% on baseline O2) that improved when O2 increased to 5 L. Per chart review, was recently treated with doxycycline for possibly pneumonia. Workup significant for WBC 6.9, BUN/Cr 25/1.10, high sensitivity troponin 36->26, +UA (culture pending), VBG pH 7.35 pCO2 73 pO2 39 SO2 62%. CT angio without large or central PE (poor timing of contrast), small R pleural effusion, bibasilar atelectasis and question of RML & RLL consolidation. Pulmonary consulted for acute on chronic hypoxic respiratory failure.     Patient seen and examined, resting in bed on NC, in no apparent distress; family at her bedside. She reports that she is unsure for how long but recently she noted increased O2 needs and increased shortness of breath. Denies sputum production but feels cough is coarse and like she cannot expectorate her secretions. Denies fever, chills, chest pain/tightness, wheezing. Reports there have been multiple people at her facility sick recently but is unsure what they had.  Has had ongoing L great toe infection that is being treated for.      Past Medical History  No past medical history on file.    Surgical History  Past Surgical History:   Procedure Laterality Date    OTHER SURGICAL HISTORY  2022     section    OTHER SURGICAL HISTORY  2022    Cyst excision    OTHER SURGICAL HISTORY  2022    Lower back surgery    OTHER SURGICAL HISTORY  2022    Hysterectomy        Social History  Social History     Tobacco Use    Smoking status: Never    Smokeless  tobacco: Never       Family History  No family history on file.       Allergies  Patient has no known allergies.    Review of Systems  10-point ROS complete and negative except as documented in HPI     Physical Exam  Vitals reviewed.   Constitutional:       General: She is not in acute distress.     Appearance: She is obese.   HENT:      Mouth/Throat:      Mouth: Mucous membranes are moist.   Eyes:      General: No scleral icterus.  Cardiovascular:      Rate and Rhythm: Normal rate and regular rhythm.      Pulses: Normal pulses.      Heart sounds: Normal heart sounds. No murmur heard.  Pulmonary:      Effort: Pulmonary effort is normal. No respiratory distress.      Breath sounds: Normal breath sounds. No wheezing, rhonchi or rales.      Comments: Diminished likely 2/2 body habitus   Musculoskeletal:      Right lower leg: Edema present.      Left lower leg: Edema present.   Skin:     General: Skin is warm and dry.      Comments: L great toe with erythema & swelling    Neurological:      General: No focal deficit present.      Mental Status: She is alert and oriented to person, place, and time.   Psychiatric:         Mood and Affect: Mood normal.         Behavior: Behavior normal.         Vital Signs  Blood pressure (!) 136/94, pulse 91, temperature 36.6 °C (97.8 °F), temperature source Temporal, resp. rate 19, height 1.524 m (5'), weight 136 kg (300 lb), SpO2 100%.  Oxygen Therapy  SpO2: 100 %  Medical Gas Therapy: Supplemental oxygen  Medical Gas Delivery Method: Nasal cannula       Medications:  Scheduled medications  heparin (porcine), 7,500 Units, subcutaneous, q8h PALOMA  ipratropium-albuteroL, 3 mL, nebulization, q6h  oxygen, , inhalation, Continuous - Inhalation  [START ON 10/30/2024] pantoprazole, 40 mg, oral, Daily before breakfast   Or  [START ON 10/30/2024] pantoprazole, 40 mg, intravenous, Daily before breakfast  piperacillin-tazobactam, 4.5 g, intravenous, q6h  polyethylene glycol, 17 g, oral, Daily        PRN medications  PRN medications: bisacodyl, bisacodyl, guaiFENesin, melatonin, ondansetron **OR** ondansetron      Relevant Results  Labs:  Results for orders placed or performed during the hospital encounter of 10/29/24 (from the past 24 hours)   ECG 12 lead   Result Value Ref Range    Ventricular Rate 95 BPM    Atrial Rate 95 BPM    WI Interval 190 ms    QRS Duration 79 ms    QT Interval 342 ms    QTC Calculation(Bazett) 430 ms    P Axis 57 degrees    R Axis 24 degrees    T Axis 64 degrees    QRS Count 15 beats    Q Onset 249 ms    T Offset 420 ms    QTC Fredericia 398 ms   CBC and Auto Differential   Result Value Ref Range    WBC 6.9 4.4 - 11.3 x10*3/uL    nRBC 0.0 0.0 - 0.0 /100 WBCs    RBC 3.69 (L) 4.00 - 5.20 x10*6/uL    Hemoglobin 12.3 12.0 - 16.0 g/dL    Hematocrit 39.1 36.0 - 46.0 %     (H) 80 - 100 fL    MCH 33.3 26.0 - 34.0 pg    MCHC 31.5 (L) 32.0 - 36.0 g/dL    RDW 12.2 11.5 - 14.5 %    Platelets 191 150 - 450 x10*3/uL    Neutrophils % 69.2 40.0 - 80.0 %    Immature Granulocytes %, Automated 0.4 0.0 - 0.9 %    Lymphocytes % 20.8 13.0 - 44.0 %    Monocytes % 7.6 2.0 - 10.0 %    Eosinophils % 1.6 0.0 - 6.0 %    Basophils % 0.4 0.0 - 2.0 %    Neutrophils Absolute 4.79 1.60 - 5.50 x10*3/uL    Immature Granulocytes Absolute, Automated 0.03 0.00 - 0.50 x10*3/uL    Lymphocytes Absolute 1.44 0.80 - 3.00 x10*3/uL    Monocytes Absolute 0.53 0.05 - 0.80 x10*3/uL    Eosinophils Absolute 0.11 0.00 - 0.40 x10*3/uL    Basophils Absolute 0.03 0.00 - 0.10 x10*3/uL   Comprehensive metabolic panel   Result Value Ref Range    Glucose 106 (H) 74 - 99 mg/dL    Sodium 140 136 - 145 mmol/L    Potassium 4.6 3.5 - 5.3 mmol/L    Chloride 95 (L) 98 - 107 mmol/L    Bicarbonate 39 (H) 21 - 32 mmol/L    Anion Gap 11 10 - 20 mmol/L    Urea Nitrogen 25 (H) 6 - 23 mg/dL    Creatinine 1.10 (H) 0.50 - 1.05 mg/dL    eGFR 52 (L) >60 mL/min/1.73m*2    Calcium 10.1 8.6 - 10.3 mg/dL    Albumin 4.0 3.4 - 5.0 g/dL    Alkaline  Phosphatase 85 33 - 136 U/L    Total Protein 7.7 6.4 - 8.2 g/dL    AST 16 9 - 39 U/L    Bilirubin, Total 0.9 0.0 - 1.2 mg/dL    ALT 11 7 - 45 U/L   Magnesium   Result Value Ref Range    Magnesium 1.76 1.60 - 2.40 mg/dL   B-Type Natriuretic Peptide   Result Value Ref Range     (H) 0 - 99 pg/mL   Troponin I, High Sensitivity, Initial   Result Value Ref Range    Troponin I, High Sensitivity 36 (H) 0 - 13 ng/L   Urinalysis with Reflex Culture and Microscopic   Result Value Ref Range    Color, Urine Light-Yellow Light-Yellow, Yellow, Dark-Yellow    Appearance, Urine Turbid (N) Clear    Specific Gravity, Urine 1.011 1.005 - 1.035    pH, Urine 5.5 5.0, 5.5, 6.0, 6.5, 7.0, 7.5, 8.0    Protein, Urine NEGATIVE NEGATIVE, 10 (TRACE), 20 (TRACE) mg/dL    Glucose, Urine Normal Normal mg/dL    Blood, Urine NEGATIVE NEGATIVE    Ketones, Urine NEGATIVE NEGATIVE mg/dL    Bilirubin, Urine NEGATIVE NEGATIVE    Urobilinogen, Urine Normal Normal mg/dL    Nitrite, Urine 2+ (A) NEGATIVE    Leukocyte Esterase, Urine 500 Shamir/µL (A) NEGATIVE   Microscopic Only, Urine   Result Value Ref Range    WBC, Urine >50 (A) 1-5, NONE /HPF    WBC Clumps, Urine OCCASIONAL Reference range not established. /HPF    RBC, Urine 6-10 (A) NONE, 1-2, 3-5 /HPF    Squamous Epithelial Cells, Urine 1-9 (SPARSE) Reference range not established. /HPF    Bacteria, Urine 1+ (A) NONE SEEN /HPF    Mucus, Urine FEW Reference range not established. /LPF   Lactate   Result Value Ref Range    Lactate 0.7 0.4 - 2.0 mmol/L   Troponin, High Sensitivity, 1 Hour   Result Value Ref Range    Troponin I, High Sensitivity 26 (H) 0 - 13 ng/L   BLOOD GAS VENOUS   Result Value Ref Range    POCT pH, Venous 7.35 7.33 - 7.43 pH    POCT pCO2, Venous 73 (HH) 41 - 51 mm Hg    POCT pO2, Venous 39 35 - 45 mm Hg    POCT SO2, Venous 62 45 - 75 %    POCT Oxy Hemoglobin, Venous 60.1 45.0 - 75.0 %    POCT Base Excess, Venous 11.4 (H) -2.0 - 3.0 mmol/L    POCT HCO3 Calculated, Venous 40.3 (H)  22.0 - 26.0 mmol/L    Patient Temperature 37.0 degrees Celsius    FiO2 32 %   MRSA Surveillance for Vancomycin De-escalation, PCR    Specimen: Anterior Nares; Swab   Result Value Ref Range    MRSA PCR Not Detected Not Detected       Imaging:  CT angio chest for pulmonary embolism  Result Date: 10/29/2024  STUDY: CT Angiogram of the Chest; 10/29/2024 11:18 AM INDICATION: Hypoxia, tachycardia. COMPARISON: None Available. ACCESSION NUMBER(S): [id number] ORDERING CLINICIAN: GENNARO ROMERO TECHNIQUE:  CTA of the chest was performed with intravenous contrast. Images are reviewed and processed at a workstation according to the CT angiogram protocol with 3-D and/or MIP post processing imaging generated.  Omnipaque 350--75 mL was administered intravenously. Automated mA/kV exposure control was utilized and patient examination was performed in strict accordance with principles of ALARA. FINDINGS: Pulmonary arteries are slightly suboptimally opacified due to late contrast bolus, but no large or central pulmonary embolism.  The thoracic aorta is normal in course and caliber without dissection or aneurysm.  Main pulmonary artery is dilated measuring 3.8 cm. The heart is normal in size without pericardial effusion.  Moderate coronary artery calcifications.  Thoracic lymph nodes are not enlarged. And small right pleural effusion.  The airways are patent. Bibasilar areas of atelectasis.  Question superimposed areas of consolidation in the right lower lobe and possibly in the right middle lobe. There is fatty infiltration of the liver with slight nodular contour the liver raising the possibility of underlying cirrhosis. Gallbladder is present. There are no acute fractures.  No suspicious bony lesions.    1. Pulmonary arteries are slightly suboptimally opacified due to late contrast bolus, but no large or central pulmonary embolism. 2. Small right pleural effusion. 3. Bibasilar areas of atelectasis. Question superimposed areas  of consolidation in the right lower lobe and possibly in the right middle lobe. Correlate clinically for possible pneumonia. 4. Main pulmonary artery is dilated measuring 3.8 cm. Findings suggestive of pulmonary arterial hypertension. 5. Hepatic steatosis with some morphologic changes raising the possibility of underlying cirrhosis. Signed by Shakir Hutchinson MD    ECG 12 lead  Result Date: 10/29/2024  Sinus rhythm Ventricular premature complex Low voltage, precordial leads        Assessment/Plan   Diane Herzog is a 74 y.o. female with a PMHx of HTN, chronic hypoxic respiratory failure on 2 L NC, SVT, pulmonary hypertension and HFpEF. Admitted 10/29/24 from nursing facility after found to have AMS and hypoxia (79% on baseline O2) that improved when O2 increased to 5 L. Per chart review, was recently treated with doxycycline for possibly pneumonia. Workup significant for WBC 6.9, BUN/Cr 25/1.10, high sensitivity troponin 36->26, +UA (culture pending), VBG pH 7.35 pCO2 73 pO2 39 SO2 62%. CT angio without large or central PE (poor timing of contrast), small R pleural effusion, bibasilar atelectasis and question of RML & RLL consolidation. Pulmonary consulted for acute on chronic hypoxic respiratory failure.    Impression:  - acute on chronic hypoxic/hypercapnic respiratory failure: on 2 L at baseline, was increased at facility to 5 L today prior to presentation to ED for pulse ox of 79% -- VBG pH 7.35, pCO2 73  - Abnormal CT - RML/RLL atelectasis vs pneumonia; patient recently treated for URI/PNA with doxycycline at SNF, she denies sputum productive, fever or chills so may just be residual findings from possible recent pneumonia or atelectasis 2/2 minimal mobility and hepatomegaly    Recommendations:  - continue supplmental O2, wean to maintain pulse ox 92-95%  - start acapella TID  - continue PRN duonebs  - defer antimicrobial coverage to ID, appreciate their recommendations   - agree with ID plan to send procal,  COVID/RSV & influenza swabs  - monitor blood culture & urine antigen testing results  - encourage out of bed to chair at least 3 times/day with meals   - consider further evaluation for enlarged liver as can contribute to atelectasis 2/2 upward compression  - would benefit from outpatient pulmonary follow-up for further testing for chronic O2 need    Thank you for the consult. Pulmonary will continue to follow at this time.     Desire Montero, APRN-CNP

## 2024-10-29 NOTE — PROGRESS NOTES
Vancomycin Dosing by Pharmacy- Cessation of Therapy    Consult to pharmacy for vancomycin dosing has been discontinued by the prescriber, pharmacy will sign off at this time.    Please call pharmacy if there are further questions or re-enter a consult if vancomycin is resumed.     Mohsen Fry [facility]

## 2024-10-29 NOTE — H&P
History Obtained From: Patient    History Of Present Illness:  Diane Herzog is a 74 y.o. female with PMHx s/f hypertension obesity BMI 58.9 chronic hypoxia baseline 2 L nasal cannula, SVT, pulmonary hypertension, diastolic heart failure presenting with increasing hypoxia..  The patient just completed a 5 to 7-day course of doxycycline reportedly for worsening respiratory status.  Patient had progressive increase in her oxygen requirement from 2 to 4 L and patient also had been getting some treatment for an ingrown toenail to the left foot.  She does complain of some nausea as well as shortness of breath occasional coughing.  Evidently patient had gone to breakfast had some decreased mentation and cyanosis in the lips oxygen saturation was 79% it improved after increasing to 5 L nasal cannula.  On arrival to the emergency department vital signs showed temperature 97.8 heart rate 101 respiratory rate 15 blood pressure 146/65 SpO2 96% on 4 L.  A chemistry panel is done showing glucose 106 sodium 140, potassium 4.6 chloride 95 bicarb 39 BUN 25 creatinine 1.10 liver enzymes within normal limits lactate 0.7 BN peptide 267 troponin initially 30 6 repeat 26 CBC without leukocytosis there is no anemia no thrombocytopenia or thrombocytosis.  Venous blood gas shows patient with pCO2 of 73 pH is 7.35 pCO2 39 the urine analysis is grossly positive for infection with 2+ nitrites 500 leukocyte esterase 6-10 RBCs and more than 50 WBCs per high-powered field.  A CTA was done to rule out pulmonary embolism there is no obvious pulmonary embolism there is small right pleural effusion bibasilar atelectasis and possible right lower lobe and right middle lobe pneumonia.  Additionally noted the pulmonary artery is dilated and there is hepatic steatosis just of of possible underlying cirrhosis.  The patient was initially given Rocephin for suspected urinary tract infection then placed on vancomycin and Zosyn once patient was found to  have pneumonia on the CT scan.  Patient was subsequently referred for admission.          ED Course:  ED Course as of 10/29/24 1337   Tue Oct 29, 2024   1205 Concern for sepsis at 1040 due to patient's UTI, tachycardia and tachypnea.  Was initially given 2 g of Rocephin.  Due to patient's hypoxia she will only be given a 500 cc IV bolus.  CT PE scan at noon shows possible pneumonia therefore Vanco and Zosyn added on to antibiotics at this time.  Plan to admit the patient. [RB]   1224 This patient was seen by the advanced practice provider.  I have personally performed a substantive portion of the encounter.  I have seen and examined the patient; agree with the workup, evaluation, MDM, management and diagnosis.  The care plan has been discussed.      I personally saw the patient and made/approved the management plan and take responsibility for the patient management.    History: Briefly patient presents here for hypoxia from a nursing home  Exam: Sitting comfortably in bed, tachycardic  MDM: I independently interpreted study(s) showing chronic CO2 retention with a CO2 of 73 with normal pH.  Lactic is normal at 0.7.  Urinalysis is concerning for infection, covered with ceftriaxone.  Troponins elevated but downtrending.  BNP is elevated at 267.  Metabolic panel shows elevation of BUN and creatinine.  CBC shows no leukocytosis, no anemia.  CT for PE shows no pulmonary embolism.  Question wall nonresolving pneumonia.  Given the patient's reported hypoxia I do have concern that this has failed treatment.  Will cover broadly with antibiotics including vancomycin and Zosyn.  Patient will be admitted for further management.   [JH]      ED Course User Index  [JH] Karan Tran MD  [RB] Estee Parikh, APRN-CNP         Diagnoses as of 10/29/24 1337   Pneumonia of right middle lobe due to infectious organism   Acute cystitis without hematuria     Relevant Results  Results for orders placed or performed during the hospital  encounter of 10/29/24 (from the past 24 hours)   ECG 12 lead   Result Value Ref Range    Ventricular Rate 95 BPM    Atrial Rate 95 BPM    LA Interval 190 ms    QRS Duration 79 ms    QT Interval 342 ms    QTC Calculation(Bazett) 430 ms    P Axis 57 degrees    R Axis 24 degrees    T Axis 64 degrees    QRS Count 15 beats    Q Onset 249 ms    T Offset 420 ms    QTC Fredericia 398 ms   CBC and Auto Differential   Result Value Ref Range    WBC 6.9 4.4 - 11.3 x10*3/uL    nRBC 0.0 0.0 - 0.0 /100 WBCs    RBC 3.69 (L) 4.00 - 5.20 x10*6/uL    Hemoglobin 12.3 12.0 - 16.0 g/dL    Hematocrit 39.1 36.0 - 46.0 %     (H) 80 - 100 fL    MCH 33.3 26.0 - 34.0 pg    MCHC 31.5 (L) 32.0 - 36.0 g/dL    RDW 12.2 11.5 - 14.5 %    Platelets 191 150 - 450 x10*3/uL    Neutrophils % 69.2 40.0 - 80.0 %    Immature Granulocytes %, Automated 0.4 0.0 - 0.9 %    Lymphocytes % 20.8 13.0 - 44.0 %    Monocytes % 7.6 2.0 - 10.0 %    Eosinophils % 1.6 0.0 - 6.0 %    Basophils % 0.4 0.0 - 2.0 %    Neutrophils Absolute 4.79 1.60 - 5.50 x10*3/uL    Immature Granulocytes Absolute, Automated 0.03 0.00 - 0.50 x10*3/uL    Lymphocytes Absolute 1.44 0.80 - 3.00 x10*3/uL    Monocytes Absolute 0.53 0.05 - 0.80 x10*3/uL    Eosinophils Absolute 0.11 0.00 - 0.40 x10*3/uL    Basophils Absolute 0.03 0.00 - 0.10 x10*3/uL   Comprehensive metabolic panel   Result Value Ref Range    Glucose 106 (H) 74 - 99 mg/dL    Sodium 140 136 - 145 mmol/L    Potassium 4.6 3.5 - 5.3 mmol/L    Chloride 95 (L) 98 - 107 mmol/L    Bicarbonate 39 (H) 21 - 32 mmol/L    Anion Gap 11 10 - 20 mmol/L    Urea Nitrogen 25 (H) 6 - 23 mg/dL    Creatinine 1.10 (H) 0.50 - 1.05 mg/dL    eGFR 52 (L) >60 mL/min/1.73m*2    Calcium 10.1 8.6 - 10.3 mg/dL    Albumin 4.0 3.4 - 5.0 g/dL    Alkaline Phosphatase 85 33 - 136 U/L    Total Protein 7.7 6.4 - 8.2 g/dL    AST 16 9 - 39 U/L    Bilirubin, Total 0.9 0.0 - 1.2 mg/dL    ALT 11 7 - 45 U/L   Magnesium   Result Value Ref Range    Magnesium 1.76 1.60 -  2.40 mg/dL   B-Type Natriuretic Peptide   Result Value Ref Range     (H) 0 - 99 pg/mL   Troponin I, High Sensitivity, Initial   Result Value Ref Range    Troponin I, High Sensitivity 36 (H) 0 - 13 ng/L   Urinalysis with Reflex Culture and Microscopic   Result Value Ref Range    Color, Urine Light-Yellow Light-Yellow, Yellow, Dark-Yellow    Appearance, Urine Turbid (N) Clear    Specific Gravity, Urine 1.011 1.005 - 1.035    pH, Urine 5.5 5.0, 5.5, 6.0, 6.5, 7.0, 7.5, 8.0    Protein, Urine NEGATIVE NEGATIVE, 10 (TRACE), 20 (TRACE) mg/dL    Glucose, Urine Normal Normal mg/dL    Blood, Urine NEGATIVE NEGATIVE    Ketones, Urine NEGATIVE NEGATIVE mg/dL    Bilirubin, Urine NEGATIVE NEGATIVE    Urobilinogen, Urine Normal Normal mg/dL    Nitrite, Urine 2+ (A) NEGATIVE    Leukocyte Esterase, Urine 500 Shamir/µL (A) NEGATIVE   Microscopic Only, Urine   Result Value Ref Range    WBC, Urine >50 (A) 1-5, NONE /HPF    WBC Clumps, Urine OCCASIONAL Reference range not established. /HPF    RBC, Urine 6-10 (A) NONE, 1-2, 3-5 /HPF    Squamous Epithelial Cells, Urine 1-9 (SPARSE) Reference range not established. /HPF    Bacteria, Urine 1+ (A) NONE SEEN /HPF    Mucus, Urine FEW Reference range not established. /LPF   Lactate   Result Value Ref Range    Lactate 0.7 0.4 - 2.0 mmol/L   Troponin, High Sensitivity, 1 Hour   Result Value Ref Range    Troponin I, High Sensitivity 26 (H) 0 - 13 ng/L   BLOOD GAS VENOUS   Result Value Ref Range    POCT pH, Venous 7.35 7.33 - 7.43 pH    POCT pCO2, Venous 73 (HH) 41 - 51 mm Hg    POCT pO2, Venous 39 35 - 45 mm Hg    POCT SO2, Venous 62 45 - 75 %    POCT Oxy Hemoglobin, Venous 60.1 45.0 - 75.0 %    POCT Base Excess, Venous 11.4 (H) -2.0 - 3.0 mmol/L    POCT HCO3 Calculated, Venous 40.3 (H) 22.0 - 26.0 mmol/L    Patient Temperature 37.0 degrees Celsius    FiO2 32 %      CT angio chest for pulmonary embolism    Result Date: 10/29/2024  STUDY: CT Angiogram of the Chest; 10/29/2024 11:18 AM  INDICATION: Hypoxia, tachycardia. COMPARISON: None Available. ACCESSION NUMBER(S): [id number] ORDERING CLINICIAN: GENNARO ROMEOR TECHNIQUE:  CTA of the chest was performed with intravenous contrast. Images are reviewed and processed at a workstation according to the CT angiogram protocol with 3-D and/or MIP post processing imaging generated.  Omnipaque 350--75 mL was administered intravenously. Automated mA/kV exposure control was utilized and patient examination was performed in strict accordance with principles of ALARA. FINDINGS: Pulmonary arteries are slightly suboptimally opacified due to late contrast bolus, but no large or central pulmonary embolism.  The thoracic aorta is normal in course and caliber without dissection or aneurysm.  Main pulmonary artery is dilated measuring 3.8 cm. The heart is normal in size without pericardial effusion.  Moderate coronary artery calcifications.  Thoracic lymph nodes are not enlarged. And small right pleural effusion.  The airways are patent. Bibasilar areas of atelectasis.  Question superimposed areas of consolidation in the right lower lobe and possibly in the right middle lobe. There is fatty infiltration of the liver with slight nodular contour the liver raising the possibility of underlying cirrhosis. Gallbladder is present. There are no acute fractures.  No suspicious bony lesions.    1. Pulmonary arteries are slightly suboptimally opacified due to late contrast bolus, but no large or central pulmonary embolism. 2. Small right pleural effusion. 3. Bibasilar areas of atelectasis. Question superimposed areas of consolidation in the right lower lobe and possibly in the right middle lobe. Correlate clinically for possible pneumonia. 4. Main pulmonary artery is dilated measuring 3.8 cm. Findings suggestive of pulmonary arterial hypertension. 5. Hepatic steatosis with some morphologic changes raising the possibility of underlying cirrhosis. Signed by Shakir Hutchinson  MD    ECG 12 lead    Result Date: 10/29/2024  Sinus rhythm Ventricular premature complex Low voltage, precordial leads     Scheduled medications:  heparin (porcine), 7,500 Units, subcutaneous, q8h PALOMA  ipratropium-albuteroL, 3 mL, nebulization, q6h  oxygen, , inhalation, Continuous - Inhalation  [START ON 10/30/2024] pantoprazole, 40 mg, oral, Daily before breakfast   Or  [START ON 10/30/2024] pantoprazole, 40 mg, intravenous, Daily before breakfast  piperacillin-tazobactam, 4.5 g, intravenous, q6h  polyethylene glycol, 17 g, oral, Daily  [START ON 10/30/2024] vancomycin, 1,000 mg, intravenous, q12h  vancomycin, 2 g, intravenous, Once      Continuous medications:     PRN medications:  PRN medications: bisacodyl, bisacodyl, guaiFENesin, melatonin, ondansetron **OR** ondansetron      Past Medical History  She has no past medical history on file.    Surgical History  She has a past surgical history that includes Other surgical history (06/07/2022); Other surgical history (06/07/2022); Other surgical history (06/07/2022); and Other surgical history (06/07/2022).     Social History  She has no history on file for tobacco use, alcohol use, and drug use.    Family History  No family history on file.     Allergies  Patient has no known allergies.    Code Status  Full Code     Review of Systems    Last Recorded Vitals  BP (!) 136/94   Pulse 91   Temp 36.6 °C (97.8 °F) (Temporal)   Resp 19   Wt 136 kg (300 lb)   SpO2 100%      Physical Exam  Vitals reviewed.   Constitutional:       General: She is not in acute distress.     Appearance: She is obese.   HENT:      Head: Normocephalic and atraumatic.      Right Ear: External ear normal.      Left Ear: External ear normal.      Nose: Nose normal.      Mouth/Throat:      Mouth: Mucous membranes are moist.      Pharynx: Oropharynx is clear.   Eyes:      General: Scleral icterus present.      Extraocular Movements: Extraocular movements intact.      Conjunctiva/sclera:  Conjunctivae normal.      Pupils: Pupils are equal, round, and reactive to light.   Cardiovascular:      Rate and Rhythm: Normal rate and regular rhythm.      Pulses: Normal pulses.      Heart sounds: Normal heart sounds. No murmur heard.  Pulmonary:      Effort: Pulmonary effort is normal. No respiratory distress.      Breath sounds: Normal breath sounds. No wheezing, rhonchi or rales.      Comments: Cough with deep inspiration  Chest:      Chest wall: No tenderness.   Abdominal:      General: Bowel sounds are normal. There is no distension.      Palpations: Abdomen is soft. There is no mass.      Tenderness: There is no abdominal tenderness. There is no rebound.   Musculoskeletal:         General: No swelling or deformity. Normal range of motion.      Cervical back: Normal range of motion.      Right lower leg: Edema present.      Left lower leg: Edema present.   Skin:     General: Skin is warm and dry.      Capillary Refill: Capillary refill takes less than 2 seconds.      Coloration: Skin is not jaundiced.      Findings: Erythema present.      Comments: Erythema left great toe   Neurological:      General: No focal deficit present.      Mental Status: She is alert and oriented to person, place, and time.   Psychiatric:         Mood and Affect: Mood normal.         Behavior: Behavior normal.         Assessment/Plan   Assessment & Plan    Acute on chronic hypoxic respiratory failure, RML RLL PNA in SNF resident  Pt failed prior tx on doxycycline  Continue vanc zosyn  Supplemental oxygen  Normal requirement 2L needs 4 presently  At risk for hypoventilation  Has no formal pulmonary diagnosis history - just on oxygen, will consult pulmonary  Continue atb per ID consult  UTI  Continue zosyn for now    L great toe erythema, concern for cellulitis  Check uric acid, esr, CRP xrau  Consult podietry    Morbid obesity BMI 58.9  Nutrition consult  I/S    Chronic diastolic heart failure  Patient appears to be compensated at  this time  Will continue patient's routine home medications.    No new Assessment & Plan notes have been filed under this hospital service since the last note was generated.  Service: Internal Medicine          Cristofer Felder, BON-JEM    Dragon dictation software was used to dictate this note and thus there may be minor errors in translation/transcription including garbled speech or misspellings. Please contact for clarification if needed.

## 2024-10-30 LAB
ANION GAP SERPL CALC-SCNC: 10 MMOL/L (ref 10–20)
BUN SERPL-MCNC: 22 MG/DL (ref 6–23)
CALCIUM SERPL-MCNC: 9 MG/DL (ref 8.6–10.3)
CHLORIDE SERPL-SCNC: 96 MMOL/L (ref 98–107)
CO2 SERPL-SCNC: 39 MMOL/L (ref 21–32)
CREAT SERPL-MCNC: 1.09 MG/DL (ref 0.5–1.05)
EGFRCR SERPLBLD CKD-EPI 2021: 53 ML/MIN/1.73M*2
ERYTHROCYTE [DISTWIDTH] IN BLOOD BY AUTOMATED COUNT: 12.3 % (ref 11.5–14.5)
GLUCOSE SERPL-MCNC: 88 MG/DL (ref 74–99)
HCT VFR BLD AUTO: 33.1 % (ref 36–46)
HGB BLD-MCNC: 10.3 G/DL (ref 12–16)
LEGIONELLA AG UR QL: NEGATIVE
MCH RBC QN AUTO: 33.6 PG (ref 26–34)
MCHC RBC AUTO-ENTMCNC: 31.1 G/DL (ref 32–36)
MCV RBC AUTO: 108 FL (ref 80–100)
NRBC BLD-RTO: 0 /100 WBCS (ref 0–0)
PLATELET # BLD AUTO: 175 X10*3/UL (ref 150–450)
POTASSIUM SERPL-SCNC: 4.4 MMOL/L (ref 3.5–5.3)
PROCALCITONIN SERPL-MCNC: 0.04 NG/ML
RBC # BLD AUTO: 3.07 X10*6/UL (ref 4–5.2)
S PNEUM AG UR QL: NEGATIVE
SODIUM SERPL-SCNC: 141 MMOL/L (ref 136–145)
WBC # BLD AUTO: 6.6 X10*3/UL (ref 4.4–11.3)

## 2024-10-30 PROCEDURE — 36415 COLL VENOUS BLD VENIPUNCTURE: CPT | Performed by: NURSE PRACTITIONER

## 2024-10-30 PROCEDURE — 1200000002 HC GENERAL ROOM WITH TELEMETRY DAILY

## 2024-10-30 PROCEDURE — 2500000001 HC RX 250 WO HCPCS SELF ADMINISTERED DRUGS (ALT 637 FOR MEDICARE OP): Performed by: INTERNAL MEDICINE

## 2024-10-30 PROCEDURE — 2500000001 HC RX 250 WO HCPCS SELF ADMINISTERED DRUGS (ALT 637 FOR MEDICARE OP): Performed by: STUDENT IN AN ORGANIZED HEALTH CARE EDUCATION/TRAINING PROGRAM

## 2024-10-30 PROCEDURE — 94668 MNPJ CHEST WALL SBSQ: CPT

## 2024-10-30 PROCEDURE — 2500000005 HC RX 250 GENERAL PHARMACY W/O HCPCS: Performed by: STUDENT IN AN ORGANIZED HEALTH CARE EDUCATION/TRAINING PROGRAM

## 2024-10-30 PROCEDURE — 2500000001 HC RX 250 WO HCPCS SELF ADMINISTERED DRUGS (ALT 637 FOR MEDICARE OP): Performed by: NURSE PRACTITIONER

## 2024-10-30 PROCEDURE — 85027 COMPLETE CBC AUTOMATED: CPT | Performed by: NURSE PRACTITIONER

## 2024-10-30 PROCEDURE — 99232 SBSQ HOSP IP/OBS MODERATE 35: CPT | Performed by: NURSE PRACTITIONER

## 2024-10-30 PROCEDURE — 80048 BASIC METABOLIC PNL TOTAL CA: CPT | Performed by: NURSE PRACTITIONER

## 2024-10-30 PROCEDURE — 2500000005 HC RX 250 GENERAL PHARMACY W/O HCPCS: Performed by: NURSE PRACTITIONER

## 2024-10-30 PROCEDURE — 2500000004 HC RX 250 GENERAL PHARMACY W/ HCPCS (ALT 636 FOR OP/ED): Performed by: NURSE PRACTITIONER

## 2024-10-30 PROCEDURE — 2500000004 HC RX 250 GENERAL PHARMACY W/ HCPCS (ALT 636 FOR OP/ED): Performed by: STUDENT IN AN ORGANIZED HEALTH CARE EDUCATION/TRAINING PROGRAM

## 2024-10-30 PROCEDURE — 99233 SBSQ HOSP IP/OBS HIGH 50: CPT | Performed by: INTERNAL MEDICINE

## 2024-10-30 RX ORDER — VANCOMYCIN HYDROCHLORIDE 1 G/20ML
INJECTION, POWDER, LYOPHILIZED, FOR SOLUTION INTRAVENOUS DAILY PRN
Status: DISCONTINUED | OUTPATIENT
Start: 2024-10-30 | End: 2024-10-31

## 2024-10-30 RX ORDER — LORAZEPAM 0.5 MG/1
0.5 TABLET ORAL ONCE
Status: COMPLETED | OUTPATIENT
Start: 2024-10-30 | End: 2024-10-30

## 2024-10-30 ASSESSMENT — COGNITIVE AND FUNCTIONAL STATUS - GENERAL
TOILETING: A LOT
MOVING FROM LYING ON BACK TO SITTING ON SIDE OF FLAT BED WITH BEDRAILS: A LOT
MOBILITY SCORE: 8
TOILETING: A LOT
CLIMB 3 TO 5 STEPS WITH RAILING: TOTAL
STANDING UP FROM CHAIR USING ARMS: TOTAL
CLIMB 3 TO 5 STEPS WITH RAILING: TOTAL
TURNING FROM BACK TO SIDE WHILE IN FLAT BAD: A LOT
WALKING IN HOSPITAL ROOM: TOTAL
HELP NEEDED FOR BATHING: A LOT
DRESSING REGULAR UPPER BODY CLOTHING: A LOT
DRESSING REGULAR LOWER BODY CLOTHING: A LOT
MOVING TO AND FROM BED TO CHAIR: TOTAL
DAILY ACTIVITIY SCORE: 16
WALKING IN HOSPITAL ROOM: TOTAL
STANDING UP FROM CHAIR USING ARMS: TOTAL
MOVING TO AND FROM BED TO CHAIR: TOTAL
MOVING FROM LYING ON BACK TO SITTING ON SIDE OF FLAT BED WITH BEDRAILS: A LOT
HELP NEEDED FOR BATHING: A LOT
DRESSING REGULAR UPPER BODY CLOTHING: A LOT
TURNING FROM BACK TO SIDE WHILE IN FLAT BAD: A LOT
DRESSING REGULAR LOWER BODY CLOTHING: A LOT

## 2024-10-30 ASSESSMENT — PAIN SCALES - GENERAL
PAINLEVEL_OUTOF10: 4
PAINLEVEL_OUTOF10: 0 - NO PAIN
PAINLEVEL_OUTOF10: 4

## 2024-10-30 ASSESSMENT — ACTIVITIES OF DAILY LIVING (ADL): LACK_OF_TRANSPORTATION: NO

## 2024-10-30 ASSESSMENT — PAIN - FUNCTIONAL ASSESSMENT: PAIN_FUNCTIONAL_ASSESSMENT: 0-10

## 2024-10-30 NOTE — PROGRESS NOTES
Diane Herzog is a 74 y.o. female on day 1 of admission presenting with Pneumonia of right middle lobe due to infectious organism.      Assessment/Plan:     #L great toe cellulitis  Patient recently had her nails cut by the podiatrist at the facility few weeks ago.  Patient is not sure of how the infection started  as she has altered sensation in her feet due to neuropathy.     #Acute on chronic hypoxic respiratory failure   Patient initially was on 2 days of oxygen about a week ago and now currently on 3 L.  Bibasilar atelectasis seen on the CT chest.  No focal consolidation.  Patient afebrile with normal WBC count.  Patient became hypoxic at the facility but in the hospital again on 3 L.  COVID/flu/RSV negative     #Gram-positive cocci in cluster blood culture positive  Only in 1 set.  If staph epi, likely contaminant.  Will start vancomycin while awaiting identification      HTN  chronic hypoxic respiratory failure on 3 L NC  pulmonary hypertension   HFpEF  RLS  essential tremor     Recommendations:     -Cont rocephin 2g q24h  -Start vancomycin.  Pharmacy to dose  -If blood cultures show staph epi, can DC vancomycin   -Will eventually switch the patient to Keflex 1 g q 8-hour on discharge to finish 7-day course of antibiotics for cellulitis  -Encourage incentive spirometer  -Will cont to follow         Gael Ya MD  Date of service: 10/30/2024  Time of service: 2:47 PM      Subjective   Interval History: No acute events overnight.  Patient denies any complaint.  States overall she is feeling better.        Review of Systems  Denies: fever, chills, nausea, vomiting, diarrhea,    Objective   Range of Vitals (last 24 hours)  Heart Rate:  []   Temp:  [36.1 °C (97 °F)-36.8 °C (98.2 °F)]   Resp:  [14-18]   BP: (121-167)/(65-97)   SpO2:  [93 %-100 %]  Daily Weight  10/29/24 : 136 kg (300 lb)   Body mass index is 58.59 kg/m².      General: alert, oriented, NAD  HEENT: No conjunctival pallor, no scleral  icterus  Neck: No LAD, No JVD  Lungs: bilaterally clear to auscultation, no wheezing  Heart: regular rate and rhythm, no murmur  Abdomen: soft, non tender, non distended, BS+  Neuro: AAO x 3, PERRL, CN grossly intact, b/l ue essential tremor  Extremities: Improving L great toe erythema with warmth  Skin: as above         Antibiotics  cefTRIAXone - 2 gram/50 mL  vancomycin  vancomycin IV 1750 mg in 500 mL D5W      Relevant Results  Labs  Results from last 72 hours   Lab Units 10/30/24  0439 10/29/24  1008   WBC AUTO x10*3/uL 6.6 6.9   HEMOGLOBIN g/dL 10.3* 12.3   HEMATOCRIT % 33.1* 39.1   PLATELETS AUTO x10*3/uL 175 191   NEUTROS PCT AUTO %  --  69.2   LYMPHS PCT AUTO %  --  20.8   MONOS PCT AUTO %  --  7.6   EOS PCT AUTO %  --  1.6     Results from last 72 hours   Lab Units 10/30/24  0439 10/29/24  1008   SODIUM mmol/L 141 140   POTASSIUM mmol/L 4.4 4.6   CHLORIDE mmol/L 96* 95*   CO2 mmol/L 39* 39*   BUN mg/dL 22 25*   CREATININE mg/dL 1.09* 1.10*   GLUCOSE mg/dL 88 106*   CALCIUM mg/dL 9.0 10.1   ANION GAP mmol/L 10 11   EGFR mL/min/1.73m*2 53* 52*     Results from last 72 hours   Lab Units 10/29/24  1008   ALK PHOS U/L 85   BILIRUBIN TOTAL mg/dL 0.9   PROTEIN TOTAL g/dL 7.7   ALT U/L 11   AST U/L 16   ALBUMIN g/dL 4.0     Estimated Creatinine Clearance: 56.6 mL/min (A) (by C-G formula based on SCr of 1.09 mg/dL (H)).  C-Reactive Protein   Date Value Ref Range Status   10/29/2024 6.31 (H) <1.00 mg/dL Final       Microbiology  No results found for the last 14 days.      Imaging    XR foot left 3+ views    Result Date: 10/29/2024  No discrete acute osseous abnormality. Extensive diffuse degenerative changes and diffuse osteopenia. Signed by Brock Montalvo MD    CT angio chest for pulmonary embolism    Result Date: 10/29/2024  1. Pulmonary arteries are slightly suboptimally opacified due to late contrast bolus, but no large or central pulmonary embolism. 2. Small right pleural effusion. 3. Bibasilar areas of  atelectasis. Question superimposed areas of consolidation in the right lower lobe and possibly in the right middle lobe. Correlate clinically for possible pneumonia. 4. Main pulmonary artery is dilated measuring 3.8 cm. Findings suggestive of pulmonary arterial hypertension. 5. Hepatic steatosis with some morphologic changes raising the possibility of underlying cirrhosis. Signed by Shakir Hutchinson MD

## 2024-10-30 NOTE — PROGRESS NOTES
Diane Herzog is a 74 y.o. female on day 1 of admission presenting with Pneumonia of right middle lobe due to infectious organism.    Subjective   Chart reviewed. No acute events documented overnight. Remains on 3 L NC with pulse ox 94-95%. COVID, RSV & influenza A/B negative, MRSA PCR negative; procal negative.    Patient seen and examined, resting in bed on 2 L NC, with pulse ox 98-99%. Sleeping on arrival but woke easily to voice. She denies any shortness of breath. Still has course cough and has been able to expectorate some thick white sputum. Denies wheezing, fever, chills, chest pain or tightness.        Objective     Physical Exam  Vitals reviewed.   Constitutional:       General: She is not in acute distress.     Appearance: She is obese.   HENT:      Mouth/Throat:      Mouth: Mucous membranes are moist.   Eyes:      General: No scleral icterus.  Cardiovascular:      Rate and Rhythm: Normal rate and regular rhythm.      Pulses: Normal pulses.      Heart sounds: Normal heart sounds. No murmur heard.  Pulmonary:      Effort: Pulmonary effort is normal. No respiratory distress.      Breath sounds: Normal breath sounds. No wheezing, rhonchi or rales.      Comments: Diminished likely 2/2 body habitus   Musculoskeletal:      Right lower leg: Edema present.      Left lower leg: Edema present.   Skin:     General: Skin is warm and dry.      Comments: L great toe with erythema  Neurological:      General: No focal deficit present.      Mental Status: She is alert and oriented to person, place, and time.   Psychiatric:         Mood and Affect: Mood normal.         Behavior: Behavior normal.     Last Recorded Vitals  Blood pressure (!) 167/97, pulse 104, temperature 36.3 °C (97.3 °F), temperature source Temporal, resp. rate 14, height 1.524 m (5'), weight 136 kg (300 lb), SpO2 94%.  Intake/Output last 3 Shifts:  I/O last 3 completed shifts:  In: 1340 (9.8 mL/kg) [P.O.:240; IV Piggyback:1100]  Out: 350 (2.6 mL/kg)  [Urine:350 (0.1 mL/kg/hr)]  Weight: 136.1 kg     Medications:  Scheduled medications  amitriptyline, 25 mg, oral, Nightly  cefTRIAXone, 2 g, intravenous, q24h  folic acid, 1 mg, oral, Daily  furosemide, 20 mg, oral, Daily  gabapentin, 400 mg, oral, TID  heparin (porcine), 7,500 Units, subcutaneous, q8h PALOMA  lidocaine, 1 patch, transdermal, Daily  metoprolol succinate XL, 50 mg, oral, Daily  oxygen, , inhalation, Continuous - Inhalation  pantoprazole, 40 mg, oral, Daily before breakfast   Or  pantoprazole, 40 mg, intravenous, Daily before breakfast  polyethylene glycol, 17 g, oral, Daily       PRN medications  PRN medications: bisacodyl, bisacodyl, guaiFENesin, ipratropium-albuteroL, melatonin, ondansetron **OR** ondansetron    Relevant Results  Labs:  Results for orders placed or performed during the hospital encounter of 10/29/24 (from the past 24 hours)   ECG 12 lead   Result Value Ref Range    Ventricular Rate 95 BPM    Atrial Rate 95 BPM    UT Interval 190 ms    QRS Duration 79 ms    QT Interval 342 ms    QTC Calculation(Bazett) 430 ms    P Axis 57 degrees    R Axis 24 degrees    T Axis 64 degrees    QRS Count 15 beats    Q Onset 249 ms    T Offset 420 ms    QTC Fredericia 398 ms   CBC and Auto Differential   Result Value Ref Range    WBC 6.9 4.4 - 11.3 x10*3/uL    nRBC 0.0 0.0 - 0.0 /100 WBCs    RBC 3.69 (L) 4.00 - 5.20 x10*6/uL    Hemoglobin 12.3 12.0 - 16.0 g/dL    Hematocrit 39.1 36.0 - 46.0 %     (H) 80 - 100 fL    MCH 33.3 26.0 - 34.0 pg    MCHC 31.5 (L) 32.0 - 36.0 g/dL    RDW 12.2 11.5 - 14.5 %    Platelets 191 150 - 450 x10*3/uL    Neutrophils % 69.2 40.0 - 80.0 %    Immature Granulocytes %, Automated 0.4 0.0 - 0.9 %    Lymphocytes % 20.8 13.0 - 44.0 %    Monocytes % 7.6 2.0 - 10.0 %    Eosinophils % 1.6 0.0 - 6.0 %    Basophils % 0.4 0.0 - 2.0 %    Neutrophils Absolute 4.79 1.60 - 5.50 x10*3/uL    Immature Granulocytes Absolute, Automated 0.03 0.00 - 0.50 x10*3/uL    Lymphocytes Absolute 1.44  0.80 - 3.00 x10*3/uL    Monocytes Absolute 0.53 0.05 - 0.80 x10*3/uL    Eosinophils Absolute 0.11 0.00 - 0.40 x10*3/uL    Basophils Absolute 0.03 0.00 - 0.10 x10*3/uL   Comprehensive metabolic panel   Result Value Ref Range    Glucose 106 (H) 74 - 99 mg/dL    Sodium 140 136 - 145 mmol/L    Potassium 4.6 3.5 - 5.3 mmol/L    Chloride 95 (L) 98 - 107 mmol/L    Bicarbonate 39 (H) 21 - 32 mmol/L    Anion Gap 11 10 - 20 mmol/L    Urea Nitrogen 25 (H) 6 - 23 mg/dL    Creatinine 1.10 (H) 0.50 - 1.05 mg/dL    eGFR 52 (L) >60 mL/min/1.73m*2    Calcium 10.1 8.6 - 10.3 mg/dL    Albumin 4.0 3.4 - 5.0 g/dL    Alkaline Phosphatase 85 33 - 136 U/L    Total Protein 7.7 6.4 - 8.2 g/dL    AST 16 9 - 39 U/L    Bilirubin, Total 0.9 0.0 - 1.2 mg/dL    ALT 11 7 - 45 U/L   Magnesium   Result Value Ref Range    Magnesium 1.76 1.60 - 2.40 mg/dL   B-Type Natriuretic Peptide   Result Value Ref Range     (H) 0 - 99 pg/mL   Troponin I, High Sensitivity, Initial   Result Value Ref Range    Troponin I, High Sensitivity 36 (H) 0 - 13 ng/L   Hemoglobin A1C   Result Value Ref Range    Hemoglobin A1C 5.4 See comment %    Estimated Average Glucose 108 Not Established mg/dL   Uric Acid   Result Value Ref Range    Uric Acid 9.6 (H) 2.3 - 6.7 mg/dL   C-reactive protein   Result Value Ref Range    C-Reactive Protein 6.31 (H) <1.00 mg/dL   Urinalysis with Reflex Culture and Microscopic   Result Value Ref Range    Color, Urine Light-Yellow Light-Yellow, Yellow, Dark-Yellow    Appearance, Urine Turbid (N) Clear    Specific Gravity, Urine 1.011 1.005 - 1.035    pH, Urine 5.5 5.0, 5.5, 6.0, 6.5, 7.0, 7.5, 8.0    Protein, Urine NEGATIVE NEGATIVE, 10 (TRACE), 20 (TRACE) mg/dL    Glucose, Urine Normal Normal mg/dL    Blood, Urine NEGATIVE NEGATIVE    Ketones, Urine NEGATIVE NEGATIVE mg/dL    Bilirubin, Urine NEGATIVE NEGATIVE    Urobilinogen, Urine Normal Normal mg/dL    Nitrite, Urine 2+ (A) NEGATIVE    Leukocyte Esterase, Urine 500 Shamir/µL (A) NEGATIVE    Extra Urine Gray Tube   Result Value Ref Range    Extra Tube Hold for add-ons.    Microscopic Only, Urine   Result Value Ref Range    WBC, Urine >50 (A) 1-5, NONE /HPF    WBC Clumps, Urine OCCASIONAL Reference range not established. /HPF    RBC, Urine 6-10 (A) NONE, 1-2, 3-5 /HPF    Squamous Epithelial Cells, Urine 1-9 (SPARSE) Reference range not established. /HPF    Bacteria, Urine 1+ (A) NONE SEEN /HPF    Mucus, Urine FEW Reference range not established. /LPF   Legionella Antigen, Urine    Specimen: Clean Catch/Voided; Urine   Result Value Ref Range    L. pneumophila Urine Ag Negative Negative   Streptococcus pneumoniae Antigen, Urine    Specimen: Clean Catch/Voided; Urine   Result Value Ref Range    Streptococcus pneumoniae Ag, Urine Negative Negative   Blood Culture    Specimen: Peripheral Venipuncture; Blood culture   Result Value Ref Range    Blood Culture Loaded on Instrument - Culture in progress    Blood Culture    Specimen: Peripheral Venipuncture; Blood culture   Result Value Ref Range    Blood Culture Loaded on Instrument - Culture in progress    Lactate   Result Value Ref Range    Lactate 0.7 0.4 - 2.0 mmol/L   Troponin, High Sensitivity, 1 Hour   Result Value Ref Range    Troponin I, High Sensitivity 26 (H) 0 - 13 ng/L   BLOOD GAS VENOUS   Result Value Ref Range    POCT pH, Venous 7.35 7.33 - 7.43 pH    POCT pCO2, Venous 73 (HH) 41 - 51 mm Hg    POCT pO2, Venous 39 35 - 45 mm Hg    POCT SO2, Venous 62 45 - 75 %    POCT Oxy Hemoglobin, Venous 60.1 45.0 - 75.0 %    POCT Base Excess, Venous 11.4 (H) -2.0 - 3.0 mmol/L    POCT HCO3 Calculated, Venous 40.3 (H) 22.0 - 26.0 mmol/L    Patient Temperature 37.0 degrees Celsius    FiO2 32 %   MRSA Surveillance for Vancomycin De-escalation, PCR    Specimen: Anterior Nares; Swab   Result Value Ref Range    MRSA PCR Not Detected Not Detected   Sedimentation rate, automated   Result Value Ref Range    Sedimentation Rate 51 (H) 0 - 30 mm/h   Sars-CoV-2 PCR   Result  Value Ref Range    Coronavirus 2019, PCR Not Detected Not Detected   Influenza A, and B PCR   Result Value Ref Range    Flu A Result Not Detected Not Detected    Flu B Result Not Detected Not Detected   RSV PCR   Result Value Ref Range    RSV PCR Not Detected Not Detected   CBC   Result Value Ref Range    WBC 6.6 4.4 - 11.3 x10*3/uL    nRBC 0.0 0.0 - 0.0 /100 WBCs    RBC 3.07 (L) 4.00 - 5.20 x10*6/uL    Hemoglobin 10.3 (L) 12.0 - 16.0 g/dL    Hematocrit 33.1 (L) 36.0 - 46.0 %     (H) 80 - 100 fL    MCH 33.6 26.0 - 34.0 pg    MCHC 31.1 (L) 32.0 - 36.0 g/dL    RDW 12.3 11.5 - 14.5 %    Platelets 175 150 - 450 x10*3/uL   Basic metabolic panel   Result Value Ref Range    Glucose 88 74 - 99 mg/dL    Sodium 141 136 - 145 mmol/L    Potassium 4.4 3.5 - 5.3 mmol/L    Chloride 96 (L) 98 - 107 mmol/L    Bicarbonate 39 (H) 21 - 32 mmol/L    Anion Gap 10 10 - 20 mmol/L    Urea Nitrogen 22 6 - 23 mg/dL    Creatinine 1.09 (H) 0.50 - 1.05 mg/dL    eGFR 53 (L) >60 mL/min/1.73m*2    Calcium 9.0 8.6 - 10.3 mg/dL     Imaging:  CT angio chest for pulmonary embolism  Result Date: 10/29/2024  STUDY: CT Angiogram of the Chest; 10/29/2024 11:18 AM INDICATION: Hypoxia, tachycardia. COMPARISON: None Available. ACCESSION NUMBER(S): [id number] ORDERING CLINICIAN: GENNARO ROMERO TECHNIQUE:  CTA of the chest was performed with intravenous contrast. Images are reviewed and processed at a workstation according to the CT angiogram protocol with 3-D and/or MIP post processing imaging generated.  Omnipaque 350--75 mL was administered intravenously. Automated mA/kV exposure control was utilized and patient examination was performed in strict accordance with principles of ALARA. FINDINGS: Pulmonary arteries are slightly suboptimally opacified due to late contrast bolus, but no large or central pulmonary embolism.  The thoracic aorta is normal in course and caliber without dissection or aneurysm.  Main pulmonary artery is dilated measuring  3.8 cm. The heart is normal in size without pericardial effusion.  Moderate coronary artery calcifications.  Thoracic lymph nodes are not enlarged. And small right pleural effusion.  The airways are patent. Bibasilar areas of atelectasis.  Question superimposed areas of consolidation in the right lower lobe and possibly in the right middle lobe. There is fatty infiltration of the liver with slight nodular contour the liver raising the possibility of underlying cirrhosis. Gallbladder is present. There are no acute fractures.  No suspicious bony lesions.    1. Pulmonary arteries are slightly suboptimally opacified due to late contrast bolus, but no large or central pulmonary embolism. 2. Small right pleural effusion. 3. Bibasilar areas of atelectasis. Question superimposed areas of consolidation in the right lower lobe and possibly in the right middle lobe. Correlate clinically for possible pneumonia. 4. Main pulmonary artery is dilated measuring 3.8 cm. Findings suggestive of pulmonary arterial hypertension. 5. Hepatic steatosis with some morphologic changes raising the possibility of underlying cirrhosis. Signed by Shakir Hutchinson MD      Assessment/Plan   Diane Herzog is a 74 y.o. female with a PMHx of HTN, chronic hypoxic respiratory failure on 2 L NC, SVT, pulmonary hypertension and HFpEF. Admitted 10/29/24 from nursing facility after found to have AMS and hypoxia (79% on baseline O2) that improved when O2 increased to 5 L. Per chart review, was recently treated with doxycycline for possibly pneumonia. Workup significant for WBC 6.9, BUN/Cr 25/1.10, high sensitivity troponin 36->26, +UA (culture pending), VBG pH 7.35 pCO2 73 pO2 39 SO2 62%. CT angio without large or central PE (poor timing of contrast), small R pleural effusion, bibasilar atelectasis and question of RML & RLL consolidation. Pulmonary consulted for acute on chronic hypoxic respiratory failure.     Impression:  - acute on chronic  hypoxic/hypercapnic respiratory failure: on 2 L at baseline, was increased at facility to 5 L today prior to presentation to ED for pulse ox of 79% -- VBG pH 7.35, pCO2 73 --   - Abnormal CT - RML/RLL atelectasis vs pneumonia; patient recently treated for URI/PNA with doxycycline at SNF, she denies sputum productive, fever or chills so may just be residual findings from possible recent pneumonia or atelectasis 2/2 minimal mobility and hepatomegaly -- MRSA, COVID, RSV & influenza negative, procal negative, WBC WNL; does not have infectious pulmonary symptoms so is likely atelectasis    Recommendations:  - continue supplmental O2, wean to maintain pulse ox 92-95%  - continue acapella TID  - continue PRN duonebs  - defer antimicrobial coverage to ID, appreciate their recommendations   - agree with ID plan to send procal, COVID/RSV & influenza swabs  - monitor blood culture & urine antigen testing results  - encourage out of bed to chair at least 3 times/day with meals   - consider further evaluation for enlarged liver as can contribute to atelectasis 2/2 upward compression    - patient is scheduled with Dr. Cerda on 12/18/24 at 11:00 for outpatient pulmonary follow-up    Patient is stable and on baseline O2 of 2 L. Does not appear to have active pulmonary infection.     Pulmonary will sign off at this time. Please reconsult us if patient develops new or worsening pulmonary needs. Thank you.       Desire Montero, APRN-CNP

## 2024-10-30 NOTE — PROGRESS NOTES
Social work consult placed for discharge planning. SW reviewed pt's chart and communicated with TCC. No SW needs foreseen at this time. SW signing off; available upon request.    Jhonatan Trinh, MSW, LSW ([id number])   Care Transitions

## 2024-10-30 NOTE — PROGRESS NOTES
Diane Herzog is a 74 y.o. female on day 1 of admission presenting with Pneumonia of right middle lobe due to infectious organism.      Subjective   History Of Present Illness:  Diane Herzog is a 74 y.o. female with PMHx s/f hypertension obesity BMI 58.9 chronic hypoxia baseline 2 L nasal cannula, SVT, pulmonary hypertension, diastolic heart failure presenting with increasing hypoxia..  The patient just completed a 5 to 7-day course of doxycycline reportedly for worsening respiratory status.  Patient had progressive increase in her oxygen requirement from 2 to 4 L and patient also had been getting some treatment for an ingrown toenail to the left foot.  She does complain of some nausea as well as shortness of breath occasional coughing.  Evidently patient had gone to breakfast had some decreased mentation and cyanosis in the lips oxygen saturation was 79% it improved after increasing to 5 L nasal cannula.  On arrival to the emergency department vital signs showed temperature 97.8 heart rate 101 respiratory rate 15 blood pressure 146/65 SpO2 96% on 4 L.  A chemistry panel is done showing glucose 106 sodium 140, potassium 4.6 chloride 95 bicarb 39 BUN 25 creatinine 1.10 liver enzymes within normal limits lactate 0.7 BN peptide 267 troponin initially 30 6 repeat 26 CBC without leukocytosis there is no anemia no thrombocytopenia or thrombocytosis.  Venous blood gas shows patient with pCO2 of 73 pH is 7.35 pCO2 39 the urine analysis is grossly positive for infection with 2+ nitrites 500 leukocyte esterase 6-10 RBCs and more than 50 WBCs per high-powered field.  A CTA was done to rule out pulmonary embolism there is no obvious pulmonary embolism there is small right pleural effusion bibasilar atelectasis and possible right lower lobe and right middle lobe pneumonia.  Additionally noted the pulmonary artery is dilated and there is hepatic steatosis just of of possible underlying cirrhosis.  The patient was initially  given Rocephin for suspected urinary tract infection then placed on vancomycin and Zosyn once patient was found to have pneumonia on the CT scan.  Patient was subsequently referred for admission.    10/30: No acute overnight events. Nurse reports decreased mental status upon arrival but has improved with oxygen. COVID, flu, RSV negative. XR of the foot demonstrates degenerative changes and diffuse osteopenia but no acute abnormalities.     Work on pulmonary toilet had discussion with the patient she is a former  from [place].  She has been nonambulatory for a while and been Y[facility] dependent for the last year.  Unable to ambulate due to severe arthritis.       Objective     Last Recorded Vitals  /80 (BP Location: Right arm, Patient Position: Lying)   Pulse 99   Temp 36.1 °C (97 °F) (Temporal)   Resp 17   Wt 136 kg (300 lb)   SpO2 95%   Intake/Output last 3 Shifts:    Intake/Output Summary (Last 24 hours) at 10/30/2024 0707  Last data filed at 10/29/2024 1823  Gross per 24 hour   Intake 1340 ml   Output 350 ml   Net 990 ml       Admission Weight  Weight: 136 kg (300 lb) (10/29/24 0902)    Daily Weight  10/29/24 : 136 kg (300 lb)    Image Results  XR foot left 3+ views  Narrative: STUDY:  Foot Radiographs; 10/29/2024 2:03 PM  INDICATION:  Great toe pain.  Evaluate for osteomyelitis.  COMPARISON:  None Available.  ACCESSION NUMBER(S):  [id number]  ORDERING CLINICIAN:  DAVE SUN  TECHNIQUE:  Three view(s) of the left foot.  FINDINGS:    There is no displaced fracture.  The alignment is anatomic.  No soft  tissue abnormality is seen. No discrete lytic or blastic lesion is  noted. There are diffuse degenerative changes. There is a small heel  spur.  Impression: No discrete acute osseous abnormality.  Extensive diffuse degenerative changes and diffuse osteopenia.  Signed by Brock Montalvo MD  CT angio chest for pulmonary embolism  Narrative: STUDY:  CT Angiogram of the Chest; 10/29/2024 11:18  AM  INDICATION:  Hypoxia, tachycardia.  COMPARISON:  None Available.  ACCESSION NUMBER(S):  [id number]  ORDERING CLINICIAN:  GENNARO ROMERO  TECHNIQUE:  CTA of the chest was performed with intravenous contrast.   Images are reviewed and processed at a workstation according to the CT  angiogram protocol with 3-D and/or MIP post processing imaging  generated.  Omnipaque 350--75 mL was administered intravenously.  Automated mA/kV exposure control was utilized and patient examination  was performed in strict accordance with principles of ALARA.  FINDINGS:  Pulmonary arteries are slightly suboptimally opacified due to late  contrast bolus, but no large or central pulmonary embolism.  The  thoracic aorta is normal in course and caliber without dissection or  aneurysm.  Main pulmonary artery is dilated measuring 3.8 cm.  The heart is normal in size without pericardial effusion.  Moderate  coronary artery calcifications.  Thoracic lymph nodes are not  enlarged.  And small right pleural effusion.  The airways are patent.  Bibasilar areas of atelectasis.  Question superimposed areas of  consolidation in the right lower lobe and possibly in the right middle  lobe.  There is fatty infiltration of the liver with slight nodular contour  the liver raising the possibility of underlying cirrhosis.   Gallbladder is present.  There are no acute fractures.  No suspicious bony lesions.  Impression: 1. Pulmonary arteries are slightly suboptimally opacified due to late  contrast bolus, but no large or central pulmonary embolism.  2. Small right pleural effusion.  3. Bibasilar areas of atelectasis. Question superimposed areas of  consolidation in the right lower lobe and possibly in the right middle  lobe. Correlate clinically for possible pneumonia.  4. Main pulmonary artery is dilated measuring 3.8 cm. Findings  suggestive of pulmonary arterial hypertension.  5. Hepatic steatosis with some morphologic changes raising  the  possibility of underlying cirrhosis.  Signed by Shakir Hutchinson MD  ECG 12 lead  Sinus rhythm  Ventricular premature complex  Low voltage, precordial leads      Physical Exam    Relevant Results  Scheduled medications  amitriptyline, 25 mg, oral, Nightly  cefTRIAXone, 2 g, intravenous, q24h  folic acid, 1 mg, oral, Daily  furosemide, 20 mg, oral, Daily  gabapentin, 400 mg, oral, TID  heparin (porcine), 7,500 Units, subcutaneous, q8h PALOMA  lidocaine, 1 patch, transdermal, Daily  metoprolol succinate XL, 50 mg, oral, Daily  oxygen, , inhalation, Continuous - Inhalation  pantoprazole, 40 mg, oral, Daily before breakfast   Or  pantoprazole, 40 mg, intravenous, Daily before breakfast  polyethylene glycol, 17 g, oral, Daily      Continuous medications     PRN medications  PRN medications: bisacodyl, bisacodyl, guaiFENesin, ipratropium-albuteroL, melatonin, ondansetron **OR** ondansetron              Assessment/Plan    Pneumonia of right middle lobe due to infectious organism  Acute on chronic respiratory failure with hypoxia and hypercapnia (Multi)  Acute cystitis  Cellulitis of toe of left foot  Chronic diastolic heart failure  Hepatic steatosis on CT  DVT Prophylaxis: SCD and heparin 7,500 subcutaneous q8hrs     Rocephin 2g in dextrose IV q24h  ID reccomendations appreciated   Discontinue Vanc &Zosyn   Pulm recommendations appreciated   Continue supplemental O2, wean to maintain ox 92-95%  Acapella TID  Discontinue PRN duonebs   Monitor Blood culture and urine antigen testing results   Encourage out of bed to chair at least 3x daily w/ meals   Encourage incentive spirometer   Folic acid 1mg PO daily   Furosemide 20 mg PO daily   Amytryptiline 25mg PO nightly   Gabapentin 400mg PO 3x/day   Lidocaine 4% patch x1   Metoprolol succinate 50 mg PO Daily   Protonix 40 mg PO daily before breakfast   Polyethylene glycol 17g PO daily   Continue all PRN meds except for duoneb   Consult podiatry  Continue to monitor labs   See  orders for complete plan       DOMINIQUE GOFF    Student shared visit with student  Patient seen and examined  Note amended and addended  See my orders for complete plan

## 2024-10-30 NOTE — PROGRESS NOTES
Physical Therapy Screen                 Therapy Communication Note    Patient Name: Diane Herzog  MRN: [id number]  Department: [facility] 2 E  Room: 2305/2305-A  Today's Date: 10/30/2024     Discipline: Physical Therapy    Missed Visit Reason:      Missed Time:     Comment: Patient screened in room 2305. Patient is LTC from NH, uses [facility] for OOB to W/C. Patient states she was not working with PT there, but was working with OT for hand exercises. No acute skilled needs for PT at this time. Will discharge from PT, recommend return to facility with prior level of care.

## 2024-10-30 NOTE — PROGRESS NOTES
10/30/24 1150   Discharge Planning   Living Arrangements Other (Comment)   Support Systems Children   Assistance Needed dependent   Type of Residence Skilled nursing facility   Home or Post Acute Services Post acute facilities (Rehab/SNF/etc)   Type of Post Acute Facility Services Long term care   Expected Discharge Disposition Inter   Does the patient need discharge transport arranged? Yes   RoundTrip coordination needed? Yes   Housing Stability   At any time in the past 12 months, were you homeless or living in a shelter (including now)? N   Transportation Needs   In the past 12 months, has lack of transportation kept you from meetings, work, or from getting things needed for daily living? No   Patient Choice   Provider Choice list and CMS website (https://medicare.gov/care-compare#search) for post-acute Quality and Resource Measure Data were provided and reviewed with: Patient   Patient / Family choosing to utilize agency / facility established prior to hospitalization Yes     I spoke with patient to introduce discharge planning and explain role of TCC. Pt states she lives at [facility] [place] (about a year and a half).  She said that she is james lift into a WC but is expecting to receive a power chair in the next couple of weeks.  She has bilat rotator cuff tears and it is difficult for her to navigate a standard WC.  Dr. Emir Patel is her PCP at the facility.  She is expecting returning to [facility] [place] on discharge. [facility] asked to send referral.    1325  Per [facility] [place], pt is private pay and LTC.  No barriers to return.

## 2024-10-30 NOTE — PROGRESS NOTES
Occupational Therapy                 Therapy Communication Note    Patient Name: Diane Herzog  MRN: [id number]  Department: [facility] 2 E  Room: 2305/2305-A  Today's Date: 10/30/2024     Discipline: Occupational Therapy              Comment: Pt seen in room at 14:49 - Pt at baseline (pt from LTC facility) requires use of james lift for all functional transfers and assist with ADL's. Pt reporting she recently was on OT 2/2 to Dupuytren's contracture and instructed on hand exercises. Pt. states she completes independently. Offer to continue OT services during hospitalization pt requesting to hold off and will resume once she returns to facility.No acute skilled OT needs at this time warrant. Will dc from OT, pt to resume OT services upon return to facility at her request.

## 2024-10-30 NOTE — CARE PLAN
The patient's goals for the shift include      The clinical goals for the shift include Patient will maintain safety throughout the shift      Problem: Skin  Goal: Promote skin healing  Outcome: Progressing     Problem: Pain - Adult  Goal: Verbalizes/displays adequate comfort level or baseline comfort level  Outcome: Progressing     Problem: Safety - Adult  Goal: Free from fall injury  Outcome: Progressing     Problem: Chronic Conditions and Co-morbidities  Goal: Patient's chronic conditions and co-morbidity symptoms are monitored and maintained or improved  Outcome: Progressing

## 2024-10-30 NOTE — PROGRESS NOTES
Vancomycin Dosing by Pharmacy- INITIAL    Diane Herzog is a 74 y.o. year old female who Pharmacy has been consulted for vancomycin dosing for other bacteremia . Based on the patient's indication and renal status this patient will be dosed based on a goal AUC of 500-600.     Renal function is currently stable.    Visit Vitals  /84 (BP Location: Right arm, Patient Position: Lying)   Pulse 104   Temp 36.3 °C (97.3 °F) (Temporal)   Resp 14        Lab Results   Component Value Date    CREATININE 1.09 (H) 10/30/2024    CREATININE 1.10 (H) 10/29/2024    CREATININE 1.07 (H) 2023    CREATININE 1.10 (H) 2023    CREATININE 1.34 (H) 2023    CREATININE 1.63 (H) 2023        Patient weight is as follows:   Vitals:    10/29/24 0902   Weight: 136 kg (300 lb)       Cultures:  No results found for the encounter in last 14 days.        I/O last 3 completed shifts:  In: 1340 (9.8 mL/kg) [P.O.:240; IV Piggyback:1100]  Out: 350 (2.6 mL/kg) [Urine:350 (0.1 mL/kg/hr)]  Weight: 136.1 kg   I/O during current shift:  No intake/output data recorded.    Temp (24hrs), Av.4 °C (97.5 °F), Min:36.1 °C (97 °F), Max:36.8 °C (98.2 °F)         Assessment/Plan     Patient has already been given a loading dose of 2000 mg.  Will initiate vancomycin maintenance, a loading dose of 2000 mg followed by a dose of 1750 mg 24 hours later.    This dosing regimen is predicted by [employer] to result in the following pharmacokinetic parameters:  Loading dose: N/A  Regimen: 1750 mg IV every 24 hours.  Start time: 13:04 on 10/30/2024  Exposure target: AUC24 (range)400-600 mg/L.hr   SMQ24-20: 488 mg/L.hr  AUC24,ss: 495 mg/L.hr  Probability of AUC24 > 400: 66 %  Ctrough,ss: 10.7 mg/L  Probability of Ctrough,ss > 20: 26 %  Will slightly underdose d/t potential for accumulation in 74yof actual wt. = 136kg; IBW = 45.5kg    Follow-up level will be ordered on 10/31 at 0500 unless clinically indicated sooner.  Will continue to monitor renal  function daily while on vancomycin and order serum creatinine at least every 48 hours if not already ordered.  Follow for continued vancomycin needs, clinical response, and signs/symptoms of toxicity.       Shakir Vallecillo, PharmD

## 2024-10-31 LAB
ANION GAP BLDV CALCULATED.4IONS-SCNC: 0 MMOL/L (ref 10–25)
ANION GAP SERPL CALC-SCNC: 9 MMOL/L (ref 10–20)
BASE EXCESS BLDV CALC-SCNC: 9.2 MMOL/L (ref -2–3)
BASOPHILS # BLD AUTO: 0.02 X10*3/UL (ref 0–0.1)
BASOPHILS NFR BLD AUTO: 0.3 %
BODY TEMPERATURE: 37 DEGREES CELSIUS
BUN SERPL-MCNC: 22 MG/DL (ref 6–23)
CA-I BLDV-SCNC: 1.21 MMOL/L (ref 1.1–1.33)
CALCIUM SERPL-MCNC: 9.3 MG/DL (ref 8.6–10.3)
CHLORIDE BLDV-SCNC: 95 MMOL/L (ref 98–107)
CHLORIDE SERPL-SCNC: 96 MMOL/L (ref 98–107)
CO2 SERPL-SCNC: 38 MMOL/L (ref 21–32)
CREAT SERPL-MCNC: 1.05 MG/DL (ref 0.5–1.05)
EGFRCR SERPLBLD CKD-EPI 2021: 55 ML/MIN/1.73M*2
EOSINOPHIL # BLD AUTO: 0.24 X10*3/UL (ref 0–0.4)
EOSINOPHIL NFR BLD AUTO: 4 %
ERYTHROCYTE [DISTWIDTH] IN BLOOD BY AUTOMATED COUNT: 12.3 % (ref 11.5–14.5)
GLUCOSE BLDV-MCNC: 87 MG/DL (ref 74–99)
GLUCOSE SERPL-MCNC: 89 MG/DL (ref 74–99)
HCO3 BLDV-SCNC: 42.2 MMOL/L (ref 22–26)
HCT VFR BLD AUTO: 35.7 % (ref 36–46)
HCT VFR BLD EST: 59 % (ref 36–46)
HGB BLD-MCNC: 10.9 G/DL (ref 12–16)
HGB BLDV-MCNC: 19.7 G/DL (ref 12–16)
IMM GRANULOCYTES # BLD AUTO: 0.04 X10*3/UL (ref 0–0.5)
IMM GRANULOCYTES NFR BLD AUTO: 0.7 % (ref 0–0.9)
INHALED O2 CONCENTRATION: 100 %
LACTATE BLDV-SCNC: 1.9 MMOL/L (ref 0.4–2)
LYMPHOCYTES # BLD AUTO: 1.01 X10*3/UL (ref 0.8–3)
LYMPHOCYTES NFR BLD AUTO: 17 %
MAGNESIUM SERPL-MCNC: 1.74 MG/DL (ref 1.6–2.4)
MCH RBC QN AUTO: 33.4 PG (ref 26–34)
MCHC RBC AUTO-ENTMCNC: 30.5 G/DL (ref 32–36)
MCV RBC AUTO: 110 FL (ref 80–100)
MONOCYTES # BLD AUTO: 0.56 X10*3/UL (ref 0.05–0.8)
MONOCYTES NFR BLD AUTO: 9.4 %
NEUTROPHILS # BLD AUTO: 4.07 X10*3/UL (ref 1.6–5.5)
NEUTROPHILS NFR BLD AUTO: 68.6 %
NRBC BLD-RTO: 0 /100 WBCS (ref 0–0)
OXYHGB MFR BLDV: 32.6 % (ref 45–75)
PCO2 BLDV: 92 MM HG (ref 41–51)
PH BLDV: 7.27 PH (ref 7.33–7.43)
PLATELET # BLD AUTO: 163 X10*3/UL (ref 150–450)
PO2 BLDV: 25 MM HG (ref 35–45)
POTASSIUM BLDV-SCNC: 4.2 MMOL/L (ref 3.5–5.3)
POTASSIUM SERPL-SCNC: 4.3 MMOL/L (ref 3.5–5.3)
RBC # BLD AUTO: 3.26 X10*6/UL (ref 4–5.2)
SAO2 % BLDV: 33 % (ref 45–75)
SODIUM BLDV-SCNC: 133 MMOL/L (ref 136–145)
SODIUM SERPL-SCNC: 139 MMOL/L (ref 136–145)
VANCOMYCIN SERPL-MCNC: 15.5 UG/ML (ref 5–20)
WBC # BLD AUTO: 5.9 X10*3/UL (ref 4.4–11.3)

## 2024-10-31 PROCEDURE — 85018 HEMOGLOBIN: CPT | Performed by: INTERNAL MEDICINE

## 2024-10-31 PROCEDURE — 2500000001 HC RX 250 WO HCPCS SELF ADMINISTERED DRUGS (ALT 637 FOR MEDICARE OP): Performed by: REGISTERED NURSE

## 2024-10-31 PROCEDURE — 2500000004 HC RX 250 GENERAL PHARMACY W/ HCPCS (ALT 636 FOR OP/ED): Performed by: NURSE PRACTITIONER

## 2024-10-31 PROCEDURE — 2500000005 HC RX 250 GENERAL PHARMACY W/O HCPCS: Performed by: STUDENT IN AN ORGANIZED HEALTH CARE EDUCATION/TRAINING PROGRAM

## 2024-10-31 PROCEDURE — 80202 ASSAY OF VANCOMYCIN: CPT | Performed by: STUDENT IN AN ORGANIZED HEALTH CARE EDUCATION/TRAINING PROGRAM

## 2024-10-31 PROCEDURE — 83735 ASSAY OF MAGNESIUM: CPT | Performed by: INTERNAL MEDICINE

## 2024-10-31 PROCEDURE — 84132 ASSAY OF SERUM POTASSIUM: CPT | Performed by: INTERNAL MEDICINE

## 2024-10-31 PROCEDURE — 1200000002 HC GENERAL ROOM WITH TELEMETRY DAILY

## 2024-10-31 PROCEDURE — 36415 COLL VENOUS BLD VENIPUNCTURE: CPT | Performed by: INTERNAL MEDICINE

## 2024-10-31 PROCEDURE — 99233 SBSQ HOSP IP/OBS HIGH 50: CPT | Performed by: INTERNAL MEDICINE

## 2024-10-31 PROCEDURE — 2500000001 HC RX 250 WO HCPCS SELF ADMINISTERED DRUGS (ALT 637 FOR MEDICARE OP): Performed by: NURSE PRACTITIONER

## 2024-10-31 PROCEDURE — 94668 MNPJ CHEST WALL SBSQ: CPT

## 2024-10-31 PROCEDURE — 2500000004 HC RX 250 GENERAL PHARMACY W/ HCPCS (ALT 636 FOR OP/ED): Performed by: STUDENT IN AN ORGANIZED HEALTH CARE EDUCATION/TRAINING PROGRAM

## 2024-10-31 PROCEDURE — 85025 COMPLETE CBC W/AUTO DIFF WBC: CPT | Performed by: INTERNAL MEDICINE

## 2024-10-31 PROCEDURE — 2500000001 HC RX 250 WO HCPCS SELF ADMINISTERED DRUGS (ALT 637 FOR MEDICARE OP): Performed by: STUDENT IN AN ORGANIZED HEALTH CARE EDUCATION/TRAINING PROGRAM

## 2024-10-31 PROCEDURE — 2500000005 HC RX 250 GENERAL PHARMACY W/O HCPCS: Performed by: NURSE PRACTITIONER

## 2024-10-31 RX ORDER — LIDOCAINE HYDROCHLORIDE AND EPINEPHRINE 10; 10 MG/ML; UG/ML
10 INJECTION, SOLUTION INFILTRATION; PERINEURAL ONCE
Status: DISCONTINUED | OUTPATIENT
Start: 2024-10-31 | End: 2024-11-01 | Stop reason: HOSPADM

## 2024-10-31 RX ORDER — HYDROXYZINE PAMOATE 25 MG/1
25 CAPSULE ORAL EVERY 6 HOURS PRN
Status: DISCONTINUED | OUTPATIENT
Start: 2024-10-31 | End: 2024-10-31 | Stop reason: SDUPTHER

## 2024-10-31 RX ORDER — HYDROXYZINE HYDROCHLORIDE 25 MG/1
25 TABLET, FILM COATED ORAL EVERY 6 HOURS PRN
Status: DISCONTINUED | OUTPATIENT
Start: 2024-10-31 | End: 2024-11-01 | Stop reason: HOSPADM

## 2024-10-31 ASSESSMENT — COGNITIVE AND FUNCTIONAL STATUS - GENERAL
DRESSING REGULAR LOWER BODY CLOTHING: A LOT
DAILY ACTIVITIY SCORE: 16
MOVING FROM LYING ON BACK TO SITTING ON SIDE OF FLAT BED WITH BEDRAILS: A LOT
CLIMB 3 TO 5 STEPS WITH RAILING: TOTAL
MOBILITY SCORE: 9
STANDING UP FROM CHAIR USING ARMS: TOTAL
TURNING FROM BACK TO SIDE WHILE IN FLAT BAD: A LOT
MOVING TO AND FROM BED TO CHAIR: A LOT
HELP NEEDED FOR BATHING: A LOT
TOILETING: A LOT
WALKING IN HOSPITAL ROOM: TOTAL
DRESSING REGULAR UPPER BODY CLOTHING: A LOT

## 2024-10-31 ASSESSMENT — PAIN - FUNCTIONAL ASSESSMENT: PAIN_FUNCTIONAL_ASSESSMENT: 0-10

## 2024-10-31 ASSESSMENT — PAIN SCALES - GENERAL
PAINLEVEL_OUTOF10: 0 - NO PAIN
PAINLEVEL_OUTOF10: 0 - NO PAIN

## 2024-10-31 NOTE — PROGRESS NOTES
Diane Herzog is a 74 y.o. female on day 2 of admission presenting with Pneumonia of right middle lobe due to infectious organism.      Subjective   History Of Present Illness:.  Diane Herzog is a 74 y.o. female with PMHx s/f hypertension obesity BMI 58.9 chronic hypoxia baseline 2 L nasal cannula, SVT, pulmonary hypertension, diastolic heart failure presenting with increasing hypoxia..  The patient just completed a 5 to 7-day course of doxycycline reportedly for worsening respiratory status.  Patient had progressive increase in her oxygen requirement from 2 to 4 L and patient also had been getting some treatment for an ingrown toenail to the left foot.  She does complain of some nausea as well as shortness of breath occasional coughing.  Evidently patient had gone to breakfast had some decreased mentation and cyanosis in the lips oxygen saturation was 79% it improved after increasing to 5 L nasal cannula.  On arrival to the emergency department vital signs showed temperature 97.8 heart rate 101 respiratory rate 15 blood pressure 146/65 SpO2 96% on 4 L.  A chemistry panel is done showing glucose 106 sodium 140, potassium 4.6 chloride 95 bicarb 39 BUN 25 creatinine 1.10 liver enzymes within normal limits lactate 0.7 BN peptide 267 troponin initially 30 6 repeat 26 CBC without leukocytosis there is no anemia no thrombocytopenia or thrombocytosis.  Venous blood gas shows patient with pCO2 of 73 pH is 7.35 pCO2 39 the urine analysis is grossly positive for infection with 2+ nitrites 500 leukocyte esterase 6-10 RBCs and more than 50 WBCs per high-powered field.  A CTA was done to rule out pulmonary embolism there is no obvious pulmonary embolism there is small right pleural effusion bibasilar atelectasis and possible right lower lobe and right middle lobe pneumonia.  Additionally noted the pulmonary artery is dilated and there is hepatic steatosis just of of possible underlying cirrhosis.  The patient was initially  given Rocephin for suspected urinary tract infection then placed on vancomycin and Zosyn once patient was found to have pneumonia on the CT scan.  Patient was subsequently referred for admission.    10/30: No acute overnight events. Nurse reports decreased mental status upon arrival but has improved with oxygen. COVID, flu, RSV negative. XR of the foot demonstrates degenerative changes and diffuse osteopenia but no acute abnormalities.     Work on pulmonary toilet had discussion with the patient she is a former  from [facility].  She has been nonambulatory for a while and been Yayo dependent for the last year.  Unable to ambulate due to severe arthritis.    10/31: Diane slept throughout the night and is feeling great this AM. She is on 2L O2 and denies SOB. No abd pain, fever, chest pain, n/v, abnormal bowel movements. She would like to return to [facility] today. Her ABG is significant for a respiratory acidosis. Urine culture shows enteric bacilli.        Objective     Last Recorded Vitals  /84 (BP Location: Left arm, Patient Position: Lying)   Pulse 84   Temp 36.3 °C (97.3 °F) (Temporal)   Resp 17   Wt 136 kg (300 lb)   SpO2 95%   Intake/Output last 3 Shifts:    Intake/Output Summary (Last 24 hours) at 10/31/2024 0900  Last data filed at 10/30/2024 2104  Gross per 24 hour   Intake 550 ml   Output 1100 ml   Net -550 ml       Admission Weight  Weight: 136 kg (300 lb) (10/29/24 0902)    Daily Weight  10/29/24 : 136 kg (300 lb)    Image Results  XR foot left 3+ views  Narrative: STUDY:  Foot Radiographs; 10/29/2024 2:03 PM  INDICATION:  Great toe pain.  Evaluate for osteomyelitis.  COMPARISON:  None Available.  ACCESSION NUMBER(S):  [id number]  ORDERING CLINICIAN:  DAVE SUN  TECHNIQUE:  Three view(s) of the left foot.  FINDINGS:    There is no displaced fracture.  The alignment is anatomic.  No soft  tissue abnormality is seen. No discrete lytic or blastic lesion is  noted. There are diffuse  degenerative changes. There is a small heel  spur.  Impression: No discrete acute osseous abnormality.  Extensive diffuse degenerative changes and diffuse osteopenia.  Signed by Brock Montalvo MD  CT angio chest for pulmonary embolism  Narrative: STUDY:  CT Angiogram of the Chest; 10/29/2024 11:18 AM  INDICATION:  Hypoxia, tachycardia.  COMPARISON:  None Available.  ACCESSION NUMBER(S):  [id number]  ORDERING CLINICIAN:  GENNARO ROMERO  TECHNIQUE:  CTA of the chest was performed with intravenous contrast.   Images are reviewed and processed at a workstation according to the CT  angiogram protocol with 3-D and/or MIP post processing imaging  generated.  Omnipaque 350--75 mL was administered intravenously.  Automated mA/kV exposure control was utilized and patient examination  was performed in strict accordance with principles of ALARA.  FINDINGS:  Pulmonary arteries are slightly suboptimally opacified due to late  contrast bolus, but no large or central pulmonary embolism.  The  thoracic aorta is normal in course and caliber without dissection or  aneurysm.  Main pulmonary artery is dilated measuring 3.8 cm.  The heart is normal in size without pericardial effusion.  Moderate  coronary artery calcifications.  Thoracic lymph nodes are not  enlarged.  And small right pleural effusion.  The airways are patent.  Bibasilar areas of atelectasis.  Question superimposed areas of  consolidation in the right lower lobe and possibly in the right middle  lobe.  There is fatty infiltration of the liver with slight nodular contour  the liver raising the possibility of underlying cirrhosis.   Gallbladder is present.  There are no acute fractures.  No suspicious bony lesions.  Impression: 1. Pulmonary arteries are slightly suboptimally opacified due to late  contrast bolus, but no large or central pulmonary embolism.  2. Small right pleural effusion.  3. Bibasilar areas of atelectasis. Question superimposed areas  of  consolidation in the right lower lobe and possibly in the right middle  lobe. Correlate clinically for possible pneumonia.  4. Main pulmonary artery is dilated measuring 3.8 cm. Findings  suggestive of pulmonary arterial hypertension.  5. Hepatic steatosis with some morphologic changes raising the  possibility of underlying cirrhosis.  Signed by Shakir Hutchinson MD  ECG 12 lead  Sinus rhythm  Ventricular premature complex  Low voltage, precordial leads      EXAM:    Constitutional:    Head and facial: Patient with redundant tissue head neck wearing nasal cannula oxygen    Neck: Redundant tissue head neck    Lungs: Decreased breath sounds bases    Heart: Regular heart rate    Abdomen: Nontender    Pelvis/: No flank tenderness    Extremities: Patient with padding on heels bilaterally leg compressors on lower extremities some pretibial edema    Neurologic: Occasional myoclonic twitching  Bedbound due to deconditioning    Dermatologic: Heel pain probably early breakdown on heels dressings in place    Psychiatric/behavioral: Patient is pleasant and appropriate no evidence of confusion currently.        Relevant Results  Results from last 7 days   Lab Units 10/31/24  0530 10/30/24  0439 10/29/24  1008   WBC AUTO x10*3/uL 5.9 6.6 6.9   HEMOGLOBIN g/dL 10.9* 10.3* 12.3   HEMATOCRIT % 35.7* 33.1* 39.1   PLATELETS AUTO x10*3/uL 163 175 191      Results from last 7 days   Lab Units 10/31/24  0530 10/30/24  0439 10/29/24  1008   SODIUM mmol/L 139 141 140   POTASSIUM mmol/L 4.3 4.4 4.6   CHLORIDE mmol/L 96* 96* 95*   CO2 mmol/L 38* 39* 39*   BUN mg/dL 22 22 25*   CREATININE mg/dL 1.05 1.09* 1.10*   CALCIUM mg/dL 9.3 9.0 10.1   PROTEIN TOTAL g/dL  --   --  7.7   BILIRUBIN TOTAL mg/dL  --   --  0.9   ALK PHOS U/L  --   --  85   ALT U/L  --   --  11   AST U/L  --   --  16   GLUCOSE mg/dL 89 88 106*      Results from last 7 days   Lab Units 10/31/24  0530 10/29/24  1008   MAGNESIUM mg/dL 1.74 1.76       Susceptibility data from  last 90 days.  Collected Specimen Info Organism   10/29/24 Urine from Clean Catch/Voided Enteric bacilli      Scheduled medications  amitriptyline, 25 mg, oral, Nightly  cefTRIAXone, 2 g, intravenous, q24h  folic acid, 1 mg, oral, Daily  furosemide, 20 mg, oral, Daily  [Held by provider] gabapentin, 400 mg, oral, TID  heparin (porcine), 7,500 Units, subcutaneous, q8h PALOMA  lidocaine, 1 patch, transdermal, Daily  lidocaine-epinephrine, 10 mL, injection, Once  metoprolol succinate XL, 50 mg, oral, Daily  oxygen, , inhalation, Continuous - Inhalation  pantoprazole, 40 mg, oral, Daily before breakfast   Or  pantoprazole, 40 mg, intravenous, Daily before breakfast  polyethylene glycol, 17 g, oral, Daily  vancomycin (Vancocin) 1,750 mg in dextrose 5% 500 mL IV, 1,750 mg, intravenous, q24h      Continuous medications     PRN medications  PRN medications: bisacodyl, bisacodyl, guaiFENesin, ipratropium-albuteroL, melatonin, ondansetron **OR** ondansetron, vancomycin              Assessment/Plan    Pneumonia of right middle lobe due to infectious organism  Acute on chronic respiratory failure with hypoxia and hypercapnia (Multi)  Acute cystitis  Cellulitis of toe of left foot  Chronic diastolic heart failure  Hepatic steatosis on CT  DVT Prophylaxis: SCD and heparin 7,500 subcutaneous q8hrs     Rocephin 2g in dextrose IV q24h  Pulm recommendations appreciated   Continue supplemental O2, wean to maintain ox 92-95%  Vanc 1,750 mg IV q24hrs  Discontinue PRN duonebs   Monitor Blood culture and urine antigen testing results   Encourage out of bed to chair at least 3x daily w/ meals   Encourage incentive spirometer   Folic acid 1mg PO daily   Furosemide 20 mg PO daily   Amytryptiline 25mg PO nightly   Hold Gabapentin \  Lidocaine 4% patch daily '  Lidocaine epinephrine 10 ml injection once  Metoprolol succinate 50 mg PO Daily   Protonix 40 mg PO daily before breakfast   Polyethylene glycol 17g PO daily   Continue all PRN meds    Podiatry consult pending   Continue to monitor labs   Check labs in a.m.  See orders for complete plan       DOMINIQUE GOFF    Student shared visit with student  Patient seen and examined  Note amended and addended  See my orders for complete plan

## 2024-10-31 NOTE — CARE PLAN
The patient's goals for the shift include      The clinical goals for the shift include Pt will remain comfortable throughout shift      Problem: Skin  Goal: Decreased wound size/increased tissue granulation at next dressing change  Outcome: Progressing  Goal: Participates in plan/prevention/treatment measures  Outcome: Progressing  Goal: Prevent/manage excess moisture  Outcome: Progressing  Goal: Prevent/minimize sheer/friction injuries  Outcome: Progressing  Goal: Promote/optimize nutrition  Outcome: Progressing  Goal: Promote skin healing  Outcome: Progressing     Problem: Pain - Adult  Goal: Verbalizes/displays adequate comfort level or baseline comfort level  Outcome: Progressing     Problem: Heart Failure  Goal: Improved gas exchange this shift  Outcome: Progressing  Goal: Improved urinary output this shift  Outcome: Progressing  Goal: Reduction in peripheral edema within 24 hours  Outcome: Progressing  Goal: Report improvement of dyspnea/breathlessness this shift  Outcome: Progressing  Goal: Weight from fluid excess reduced over 2-3 days, then stabilize  Outcome: Progressing  Goal: Increase self care and/or family involvement in 24 hours  Outcome: Progressing     Problem: Heart Failure  Goal: Improved gas exchange this shift  Outcome: Progressing  Goal: Improved urinary output this shift  Outcome: Progressing  Goal: Reduction in peripheral edema within 24 hours  Outcome: Progressing  Goal: Report improvement of dyspnea/breathlessness this shift  Outcome: Progressing  Goal: Weight from fluid excess reduced over 2-3 days, then stabilize  Outcome: Progressing  Goal: Increase self care and/or family involvement in 24 hours  Outcome: Progressing

## 2024-10-31 NOTE — CARE PLAN
The patient's goals for the shift include      The clinical goals for the shift include Pt will remain comfortable throughout shift

## 2024-10-31 NOTE — CARE PLAN
The patient's goals for the shift include      Problem: Skin  Goal: Decreased wound size/increased tissue granulation at next dressing change  Outcome: Progressing  Flowsheets (Taken 10/31/2024 0740)  Decreased wound size/increased tissue granulation at next dressing change:   Promote sleep for wound healing   Protective dressings over bony prominences  Goal: Participates in plan/prevention/treatment measures  Outcome: Progressing  Flowsheets (Taken 10/31/2024 0740)  Participates in plan/prevention/treatment measures:   Elevate heels   Discuss with provider PT/OT consult   Increase activity/out of bed for meals  Goal: Prevent/manage excess moisture  Outcome: Progressing  Flowsheets (Taken 10/31/2024 0740)  Prevent/manage excess moisture:   Follow provider orders for dressing changes   Monitor for/manage infection if present   Cleanse incontinence/protect with barrier cream  Goal: Prevent/minimize sheer/friction injuries  Outcome: Progressing  Flowsheets (Taken 10/31/2024 0740)  Prevent/minimize sheer/friction injuries:   Increase activity/out of bed for meals   HOB 30 degrees or less   Complete micro-shifts as needed if patient unable. Adjust patient position to relieve pressure points, not a full turn   Turn/reposition every 2 hours/use positioning/transfer devices  Goal: Promote/optimize nutrition  Outcome: Progressing  Flowsheets (Taken 10/31/2024 0740)  Promote/optimize nutrition: Monitor/record intake including meals  Goal: Promote skin healing  Outcome: Progressing  Flowsheets (Taken 10/31/2024 0740)  Promote skin healing:   Assess skin/pad under line(s)/device(s)   Ensure correct size (line/device) and apply per  instructions   Protective dressings over bony prominences   Rotate device position/do not position patient on device     Problem: Pain - Adult  Goal: Verbalizes/displays adequate comfort level or baseline comfort level  Outcome: Progressing     Problem: Safety - Adult  Goal: Free from  fall injury  Outcome: Progressing     Problem: Discharge Planning  Goal: Discharge to home or other facility with appropriate resources  Outcome: Progressing     Problem: Chronic Conditions and Co-morbidities  Goal: Patient's chronic conditions and co-morbidity symptoms are monitored and maintained or improved  Outcome: Progressing     Problem: Heart Failure  Goal: Improved gas exchange this shift  Outcome: Progressing  Goal: Improved urinary output this shift  Outcome: Progressing  Goal: Reduction in peripheral edema within 24 hours  Outcome: Progressing  Goal: Report improvement of dyspnea/breathlessness this shift  Outcome: Progressing  Goal: Weight from fluid excess reduced over 2-3 days, then stabilize  Outcome: Progressing  Goal: Increase self care and/or family involvement in 24 hours  Outcome: Progressing     Problem: Respiratory  Goal: Clear secretions with interventions this shift  Outcome: Progressing  Goal: Minimize anxiety/maximize coping throughout shift  Outcome: Progressing  Goal: No signs of respiratory distress (eg. Use of accessory muscles. Peds grunting)  Outcome: Progressing  Goal: Wean oxygen to maintain O2 saturation per order/standard this shift  Outcome: Progressing

## 2024-10-31 NOTE — NURSING NOTE
Call from SDU that pt was showing Vfib. Pt assessed. No SOB, or chest discomfort. Leads checked. Called SDU to verify reading was improved.

## 2024-11-01 VITALS
RESPIRATION RATE: 18 BRPM | SYSTOLIC BLOOD PRESSURE: 125 MMHG | HEART RATE: 90 BPM | BODY MASS INDEX: 57.52 KG/M2 | DIASTOLIC BLOOD PRESSURE: 80 MMHG | HEIGHT: 60 IN | OXYGEN SATURATION: 98 % | WEIGHT: 293 LBS | TEMPERATURE: 97.7 F

## 2024-11-01 LAB
BACTERIA BLD AEROBE CULT: ABNORMAL
BACTERIA BLD CULT: ABNORMAL
BACTERIA UR CULT: ABNORMAL
BACTERIA UR CULT: ABNORMAL
GRAM STN SPEC: ABNORMAL

## 2024-11-01 PROCEDURE — 2500000004 HC RX 250 GENERAL PHARMACY W/ HCPCS (ALT 636 FOR OP/ED): Performed by: STUDENT IN AN ORGANIZED HEALTH CARE EDUCATION/TRAINING PROGRAM

## 2024-11-01 PROCEDURE — 2500000005 HC RX 250 GENERAL PHARMACY W/O HCPCS: Performed by: STUDENT IN AN ORGANIZED HEALTH CARE EDUCATION/TRAINING PROGRAM

## 2024-11-01 PROCEDURE — 99239 HOSP IP/OBS DSCHRG MGMT >30: CPT | Performed by: INTERNAL MEDICINE

## 2024-11-01 PROCEDURE — 2500000005 HC RX 250 GENERAL PHARMACY W/O HCPCS: Performed by: NURSE PRACTITIONER

## 2024-11-01 PROCEDURE — 2500000001 HC RX 250 WO HCPCS SELF ADMINISTERED DRUGS (ALT 637 FOR MEDICARE OP): Performed by: NURSE PRACTITIONER

## 2024-11-01 PROCEDURE — 2500000001 HC RX 250 WO HCPCS SELF ADMINISTERED DRUGS (ALT 637 FOR MEDICARE OP): Performed by: STUDENT IN AN ORGANIZED HEALTH CARE EDUCATION/TRAINING PROGRAM

## 2024-11-01 PROCEDURE — 94668 MNPJ CHEST WALL SBSQ: CPT

## 2024-11-01 PROCEDURE — 2500000001 HC RX 250 WO HCPCS SELF ADMINISTERED DRUGS (ALT 637 FOR MEDICARE OP): Performed by: REGISTERED NURSE

## 2024-11-01 PROCEDURE — 2500000004 HC RX 250 GENERAL PHARMACY W/ HCPCS (ALT 636 FOR OP/ED): Performed by: NURSE PRACTITIONER

## 2024-11-01 RX ORDER — ONDANSETRON 4 MG/1
8 TABLET, FILM COATED ORAL EVERY 8 HOURS PRN
Qty: 20 TABLET | Refills: 0 | Status: SHIPPED | OUTPATIENT
Start: 2024-11-01

## 2024-11-01 RX ORDER — ROPINIROLE 1 MG/1
1 TABLET, FILM COATED ORAL NIGHTLY
Start: 2024-11-01

## 2024-11-01 RX ORDER — TALC
3 POWDER (GRAM) TOPICAL NIGHTLY PRN
Qty: 10 TABLET | Refills: 0 | Status: SHIPPED | OUTPATIENT
Start: 2024-11-01

## 2024-11-01 RX ORDER — AMOXICILLIN AND CLAVULANATE POTASSIUM 875; 125 MG/1; MG/1
1 TABLET, FILM COATED ORAL 2 TIMES DAILY
Qty: 10 TABLET | Refills: 0 | Status: SHIPPED | OUTPATIENT
Start: 2024-11-01 | End: 2024-11-06

## 2024-11-01 RX ORDER — GUAIFENESIN 600 MG/1
1200 TABLET, EXTENDED RELEASE ORAL 2 TIMES DAILY
Qty: 30 TABLET | Refills: 0 | Status: SHIPPED | OUTPATIENT
Start: 2024-11-01

## 2024-11-01 RX ORDER — ACETAMINOPHEN 325 MG/1
650 TABLET ORAL EVERY 6 HOURS PRN
Status: DISCONTINUED | OUTPATIENT
Start: 2024-11-01 | End: 2024-11-01 | Stop reason: HOSPADM

## 2024-11-01 RX ORDER — MINOCYCLINE HYDROCHLORIDE 100 MG/1
100 CAPSULE ORAL 2 TIMES DAILY
Qty: 110 CAPSULE | Refills: 0 | Status: SHIPPED | OUTPATIENT
Start: 2024-11-01

## 2024-11-01 ASSESSMENT — COGNITIVE AND FUNCTIONAL STATUS - GENERAL
MOBILITY SCORE: 24
DAILY ACTIVITIY SCORE: 24

## 2024-11-01 NOTE — PROGRESS NOTES
Diane Herzog is a 74 y.o. female on day 2 of admission presenting with Pneumonia of right middle lobe due to infectious organism.      Assessment/Plan:     #L great toe cellulitis  Patient recently had her nails cut by the podiatrist at the facility few weeks ago.  Patient is not sure of how the infection started  as she has altered sensation in her feet due to neuropathy.     #Acute on chronic hypoxic respiratory failure   Patient initially was on 2 days of oxygen about a week ago and now currently on 3 L.  Bibasilar atelectasis seen on the CT chest.  No focal consolidation.  Patient afebrile with normal WBC count.  Patient became hypoxic at the facility but in the hospital again on 3 L.  COVID/flu/RSV negative     #Gram-positive cocci in cluster blood culture positive  Only in 1 set.  If staph epi, likely contaminant.  Will start vancomycin while awaiting identification      HTN  chronic hypoxic respiratory failure on 3 L NC  pulmonary hypertension   HFpEF  RLS  essential tremor     Recommendations:  -Follow Ucx, 100k enteric bacteria  -Cont rocephin 2g q24h  -Stop vancomycin.  SCN in Bcx is a contaminant  -Will eventually switch the patient to Keflex 1 g q 8-hour on discharge to finish 7-day course of antibiotics for cellulitis  -Encourage incentive spirometer  -Will cont to follow       Markie Garrett MD  [phone]      Subjective   Interval History: No acute events overnight.  Patient denies any complaint.  States overall she is feeling better.        Review of Systems  Denies: fever, chills, nausea, vomiting, diarrhea,    Objective   Range of Vitals (last 24 hours)  Heart Rate:  []   Temp:  [36.2 °C (97.2 °F)-36.6 °C (97.9 °F)]   Resp:  [16-20]   BP: (116-131)/(57-89)   SpO2:  [93 %-99 %]  Daily Weight  10/29/24 : 136 kg (300 lb)   Body mass index is 58.59 kg/m².      General: alert, oriented, NAD  HEENT: No conjunctival pallor, no scleral icterus  Neck: No LAD, No JVD  Lungs: bilaterally clear to  auscultation, no wheezing  Heart: regular rate and rhythm, no murmur  Abdomen: soft, non tender, non distended, BS+  Neuro: AAO x 3, PERRL, CN grossly intact, b/l ue essential tremor  Extremities: Improving L great toe erythema with warmth  Skin: as above         Antibiotics  cefTRIAXone - 2 gram/50 mL  vancomycin  vancomycin IV 1750 mg in 500 mL D5W      Relevant Results  Labs  Results from last 72 hours   Lab Units 10/31/24  0530 10/30/24  0439 10/29/24  1008   WBC AUTO x10*3/uL 5.9 6.6 6.9   HEMOGLOBIN g/dL 10.9* 10.3* 12.3   HEMATOCRIT % 35.7* 33.1* 39.1   PLATELETS AUTO x10*3/uL 163 175 191   NEUTROS PCT AUTO % 68.6  --  69.2   LYMPHS PCT AUTO % 17.0  --  20.8   MONOS PCT AUTO % 9.4  --  7.6   EOS PCT AUTO % 4.0  --  1.6     Results from last 72 hours   Lab Units 10/31/24  0530 10/30/24  0439 10/29/24  1008   SODIUM mmol/L 139 141 140   POTASSIUM mmol/L 4.3 4.4 4.6   CHLORIDE mmol/L 96* 96* 95*   CO2 mmol/L 38* 39* 39*   BUN mg/dL 22 22 25*   CREATININE mg/dL 1.05 1.09* 1.10*   GLUCOSE mg/dL 89 88 106*   CALCIUM mg/dL 9.3 9.0 10.1   ANION GAP mmol/L 9* 10 11   EGFR mL/min/1.73m*2 55* 53* 52*     Results from last 72 hours   Lab Units 10/29/24  1008   ALK PHOS U/L 85   BILIRUBIN TOTAL mg/dL 0.9   PROTEIN TOTAL g/dL 7.7   ALT U/L 11   AST U/L 16   ALBUMIN g/dL 4.0     Estimated Creatinine Clearance: 58.8 mL/min (by C-G formula based on SCr of 1.05 mg/dL).  C-Reactive Protein   Date Value Ref Range Status   10/29/2024 6.31 (H) <1.00 mg/dL Final       Microbiology  Susceptibility data from last 14 days.  Collected Specimen Info Organism   10/29/24 Blood culture from Peripheral Venipuncture Staphylococcus epidermidis   10/29/24 Urine from Clean Catch/Voided Enteric bacilli       Imaging    XR foot left 3+ views    Result Date: 10/29/2024  No discrete acute osseous abnormality. Extensive diffuse degenerative changes and diffuse osteopenia. Signed by Brock Montalvo MD    CT angio chest for pulmonary embolism    Result  Date: 10/29/2024  1. Pulmonary arteries are slightly suboptimally opacified due to late contrast bolus, but no large or central pulmonary embolism. 2. Small right pleural effusion. 3. Bibasilar areas of atelectasis. Question superimposed areas of consolidation in the right lower lobe and possibly in the right middle lobe. Correlate clinically for possible pneumonia. 4. Main pulmonary artery is dilated measuring 3.8 cm. Findings suggestive of pulmonary arterial hypertension. 5. Hepatic steatosis with some morphologic changes raising the possibility of underlying cirrhosis. Signed by Shakir Hutchinson MD

## 2024-11-01 NOTE — PROGRESS NOTES
11/01/24 1043   Discharge Planning   Type of Residence Nursing home/residential care   Does the patient need discharge transport arranged? Yes   RoundTrip coordination needed? Yes   Has discharge transport been arranged? No     Discharge order in, notified [facility] of same. Sent secure chat to nurse inquiring about pt transport needs (stretcher/ wheelchair) and if any other medical equipment needed at discharge, still awaiting response. Noified Dr. Anderson by secure chat and phone call that Length of stay needs to say >180 days and level of care changed to intermediate, still awaiting same. Requested assist in competing IMM. CT will follow.     1140-Floor nurse notified of following. Report# [phone]    The [employer] Vehicle you requested for Diane KEN in unit/room 2305.01 on 11/01/2024 is scheduled to arrive at 1:00pm EDT! [facility] is handling this ride and you can contact them at [phone].     1153- Pt notified of  time. Pt requested that Ronak be called and notified at [facility], [phone], left  without pt name or info, requesting return call. CT will follow.

## 2024-11-01 NOTE — DISCHARGE SUMMARY
Discharge Diagnosis    Pneumonia of right middle lobe due to infectious organism  Acute on chronic respiratory failure with hypoxia and hypercapnia (Multi)  Acute cystitis  Cellulitis of toe of left foot  Chronic diastolic heart failure  Hepatic steatosis on CT    Issues Requiring Follow-Up    Discharge on Augmentin and minocycline to finish out course of treatment for pneumonia.    Will start Requip 1 mg nightly for restless leg syndrome.    See after visit summary and goldenrod for complete plan.    Discharge Meds     Medication List      START taking these medications     amoxicillin-pot clavulanate 875-125 mg tablet; Commonly known as:   Augmentin; Take 1 tablet by mouth 2 times a day for 5 days.   guaiFENesin 600 mg 12 hr tablet; Commonly known as: Mucinex; Take 2   tablets (1,200 mg) by mouth 2 times a day. Do not crush, chew, or split.   melatonin 3 mg tablet; Take 1 tablet (3 mg) by mouth as needed at   bedtime for sleep.   minocycline 100 mg capsule; Take 1 capsule (100 mg) by mouth 2 times a   day.   ondansetron 4 mg tablet; Commonly known as: Zofran; Take 2 tablets (8   mg) by mouth every 8 hours if needed for nausea or vomiting.   oxygen gas therapy; Commonly known as: O2; Inhale 3 L/min once every 24   hours.   rOPINIRole 1 mg tablet; Commonly known as: Requip; Take 1 tablet (1 mg)   by mouth once daily at bedtime.     CONTINUE taking these medications     acetaminophen 500 mg tablet; Commonly known as: Tylenol   amitriptyline 25 mg tablet; Commonly known as: Elavil   biotin 1 mg tablet   cholecalciferol 5,000 Units tablet; Commonly known as: Vitamin D-3   cranberry 450 mg tablet; Generic drug: cranberry fruit   docusate sodium 100 mg capsule; Commonly known as: Colace   ferrous sulfate 325 (65 Fe) MG EC tablet   folic acid 1 mg tablet; Commonly known as: Folvite   furosemide 20 mg tablet; Commonly known as: Lasix   gabapentin 400 mg capsule; Commonly known as: Neurontin   ipratropium-albuteroL 0.5-2.5  mg/3 mL nebulizer solution; Commonly known   as: Duo-Neb   Lidocaine Pain Relief 4 % patch; Generic drug: lidocaine   metoprolol succinate XL 50 mg 24 hr tablet; Commonly known as: Toprol-XL   ondansetron ODT 4 mg disintegrating tablet; Commonly known as:   Zofran-ODT   polyethylene glycol 17 gram packet; Commonly known as: Glycolax, Miralax       Test Results Pending At Discharge  Pending Labs       Order Current Status    Blood Culture Preliminary result    Blood Culture Preliminary result            Hospital Course   Diane Herzog is a 74 y.o. female on day 3 of admission presenting with Pneumonia of right middle lobe due to infectious organism.           Subjective  History Of Present Illness:.  Diane Herzog is a 74 y.o. female with PMHx s/f hypertension obesity BMI 58.9 chronic hypoxia baseline 2 L nasal cannula, SVT, pulmonary hypertension, diastolic heart failure presenting with increasing hypoxia..  The patient just completed a 5 to 7-day course of doxycycline reportedly for worsening respiratory status.  Patient had progressive increase in her oxygen requirement from 2 to 4 L and patient also had been getting some treatment for an ingrown toenail to the left foot.  She does complain of some nausea as well as shortness of breath occasional coughing.  Evidently patient had gone to breakfast had some decreased mentation and cyanosis in the lips oxygen saturation was 79% it improved after increasing to 5 L nasal cannula.  On arrival to the emergency department vital signs showed temperature 97.8 heart rate 101 respiratory rate 15 blood pressure 146/65 SpO2 96% on 4 L.  A chemistry panel is done showing glucose 106 sodium 140, potassium 4.6 chloride 95 bicarb 39 BUN 25 creatinine 1.10 liver enzymes within normal limits lactate 0.7 BN peptide 267 troponin initially 30 6 repeat 26 CBC without leukocytosis there is no anemia no thrombocytopenia or thrombocytosis.  Venous blood gas shows patient with pCO2 of 73  pH is 7.35 pCO2 39 the urine analysis is grossly positive for infection with 2+ nitrites 500 leukocyte esterase 6-10 RBCs and more than 50 WBCs per high-powered field.  A CTA was done to rule out pulmonary embolism there is no obvious pulmonary embolism there is small right pleural effusion bibasilar atelectasis and possible right lower lobe and right middle lobe pneumonia.  Additionally noted the pulmonary artery is dilated and there is hepatic steatosis just of of possible underlying cirrhosis.  The patient was initially given Rocephin for suspected urinary tract infection then placed on vancomycin and Zosyn once patient was found to have pneumonia on the CT scan.  Patient was subsequently referred for admission.     10/30: No acute overnight events. Nurse reports decreased mental status upon arrival but has improved with oxygen. COVID, flu, RSV negative. XR of the foot demonstrates degenerative changes and diffuse osteopenia but no acute abnormalities.      Work on pulmonary toilet had discussion with the patient she is a former  from [place].  She has been nonambulatory for a while and been Yayo dependent for the last year.  Unable to ambulate due to severe arthritis.     10/31: Diane slept throughout the night and is feeling great this AM. She is on 2L O2 and denies SOB. No abd pain, fever, chest pain, n/v, abnormal bowel movements. She would like to return to [facility] today. Her ABG is significant for a respiratory acidosis. Urine culture shows enteric bacilli.      11/1:   Patient complaining of restless leg syndrome at night will start Requip will discharge on Augmentin and short course of minocycline for pneumonia.  Clinically doing well enough to return back to [facility].       See after visit summary and goldenrod for complete plan.       35 minutes spent in the care of this patient.       Pertinent Physical Exam At Time of Discharge  Physical Exam   see today's progress note for more physical exam  findings.  Outpatient Follow-Up  Future Appointments   Date Time Provider Department Center   12/18/2024 11:00 AM [facility] Rafael Cutler MD

## 2024-11-01 NOTE — PROGRESS NOTES
Diane Herzog is a 74 y.o. female on day 3 of admission presenting with Pneumonia of right middle lobe due to infectious organism.      Assessment/Plan:     #L great toe cellulitis  Patient recently had her nails cut by the podiatrist at the facility few weeks ago.  Patient is not sure of how the infection started  as she has altered sensation in her feet due to neuropathy.     #Acute on chronic hypoxic respiratory failure   Patient initially was on 2 days of oxygen about a week ago and now currently on 3 L.  Bibasilar atelectasis seen on the CT chest.  No focal consolidation.  Patient afebrile with normal WBC count.  Patient became hypoxic at the facility but in the hospital again on 3 L.  COVID/flu/RSV negative     #Gram-positive cocci in cluster blood culture positive  Only in 1 set.  If staph epi, likely contaminant.  Will start vancomycin while awaiting identification      HTN  chronic hypoxic respiratory failure on 3 L NC  pulmonary hypertension   HFpEF  RLS  essential tremor     Recommendations:  -Follow Ucx, 100k enteric bacteria  -Cont rocephin 2g q24h  -Stop vancomycin.  SCN in Bcx is a contaminant  -Will eventually switch the patient to Keflex 1 g q 8-hour on discharge to finish 7-day course of antibiotics for cellulitis  -Encourage incentive spirometer  -Will cont to follow   Discharge planning      Markie Garrett MD  [phone]      Subjective   Interval History: No acute events overnight.  Patient denies any complaint.  States overall she is feeling better.        Review of Systems  Denies: fever, chills, nausea, vomiting, diarrhea,    Objective   Range of Vitals (last 24 hours)  Heart Rate:  []   Temp:  [36.1 °C (97 °F)-36.5 °C (97.7 °F)]   Resp:  [18-20]   BP: (113-156)/(78-95)   SpO2:  [94 %-99 %]  Daily Weight  10/29/24 : 136 kg (300 lb)   Body mass index is 58.59 kg/m².      General: alert, oriented, NAD  HEENT: No conjunctival pallor, no scleral icterus  Neck: No LAD, No JVD  Lungs:  bilaterally clear to auscultation, no wheezing  Heart: regular rate and rhythm, no murmur  Abdomen: soft, non tender, non distended, BS+  Neuro: AAO x 3, PERRL, CN grossly intact, b/l ue essential tremor  Extremities: Improving L great toe erythema with warmth  Skin: as above         Antibiotics  amoxicillin-pot clavulanate - 875-125 mg  cefTRIAXone - 2 gram/50 mL  minocycline - 100 mg      Relevant Results  Labs  Results from last 72 hours   Lab Units 10/31/24  0530 10/30/24  0439   WBC AUTO x10*3/uL 5.9 6.6   HEMOGLOBIN g/dL 10.9* 10.3*   HEMATOCRIT % 35.7* 33.1*   PLATELETS AUTO x10*3/uL 163 175   NEUTROS PCT AUTO % 68.6  --    LYMPHS PCT AUTO % 17.0  --    MONOS PCT AUTO % 9.4  --    EOS PCT AUTO % 4.0  --      Results from last 72 hours   Lab Units 10/31/24  0530 10/30/24  0439   SODIUM mmol/L 139 141   POTASSIUM mmol/L 4.3 4.4   CHLORIDE mmol/L 96* 96*   CO2 mmol/L 38* 39*   BUN mg/dL 22 22   CREATININE mg/dL 1.05 1.09*   GLUCOSE mg/dL 89 88   CALCIUM mg/dL 9.3 9.0   ANION GAP mmol/L 9* 10   EGFR mL/min/1.73m*2 55* 53*           Estimated Creatinine Clearance: 58.8 mL/min (by C-G formula based on SCr of 1.05 mg/dL).  C-Reactive Protein   Date Value Ref Range Status   10/29/2024 6.31 (H) <1.00 mg/dL Final       Microbiology  Susceptibility data from last 14 days.  Collected Specimen Info Organism Amoxicillin/Clavulanate Ampicillin Ampicillin/Sulbactam Cefazolin Cefazolin (uncomplicated UTIs only) Ciprofloxacin Gentamicin Nitrofurantoin Piperacillin/Tazobactam Tetracycline   10/29/24 Blood culture from Peripheral Venipuncture Staphylococcus epidermidis             10/29/24 Urine from Clean Catch/Voided Escherichia coli  S  R  S  S  S  I  S  S  S      Proteus mirabilis   S   S  S  S  S  R  S  R     Collected Specimen Info Organism Trimethoprim/Sulfamethoxazole   10/29/24 Blood culture from Peripheral Venipuncture Staphylococcus epidermidis    10/29/24 Urine from Clean Catch/Voided Escherichia coli  S     Proteus  francois  S       Imaging    XR foot left 3+ views    Result Date: 10/29/2024  No discrete acute osseous abnormality. Extensive diffuse degenerative changes and diffuse osteopenia. Signed by Brock Montalvo MD    CT angio chest for pulmonary embolism    Result Date: 10/29/2024  1. Pulmonary arteries are slightly suboptimally opacified due to late contrast bolus, but no large or central pulmonary embolism. 2. Small right pleural effusion. 3. Bibasilar areas of atelectasis. Question superimposed areas of consolidation in the right lower lobe and possibly in the right middle lobe. Correlate clinically for possible pneumonia. 4. Main pulmonary artery is dilated measuring 3.8 cm. Findings suggestive of pulmonary arterial hypertension. 5. Hepatic steatosis with some morphologic changes raising the possibility of underlying cirrhosis. Signed by Shakir Hutchinson MD

## 2024-11-01 NOTE — PROGRESS NOTES
Diane Herzog is a 74 y.o. female on day 3 of admission presenting with Pneumonia of right middle lobe due to infectious organism.      Subjective   History Of Present Illness:.  Diane Herzog is a 74 y.o. female with PMHx s/f hypertension obesity BMI 58.9 chronic hypoxia baseline 2 L nasal cannula, SVT, pulmonary hypertension, diastolic heart failure presenting with increasing hypoxia..  The patient just completed a 5 to 7-day course of doxycycline reportedly for worsening respiratory status.  Patient had progressive increase in her oxygen requirement from 2 to 4 L and patient also had been getting some treatment for an ingrown toenail to the left foot.  She does complain of some nausea as well as shortness of breath occasional coughing.  Evidently patient had gone to breakfast had some decreased mentation and cyanosis in the lips oxygen saturation was 79% it improved after increasing to 5 L nasal cannula.  On arrival to the emergency department vital signs showed temperature 97.8 heart rate 101 respiratory rate 15 blood pressure 146/65 SpO2 96% on 4 L.  A chemistry panel is done showing glucose 106 sodium 140, potassium 4.6 chloride 95 bicarb 39 BUN 25 creatinine 1.10 liver enzymes within normal limits lactate 0.7 BN peptide 267 troponin initially 30 6 repeat 26 CBC without leukocytosis there is no anemia no thrombocytopenia or thrombocytosis.  Venous blood gas shows patient with pCO2 of 73 pH is 7.35 pCO2 39 the urine analysis is grossly positive for infection with 2+ nitrites 500 leukocyte esterase 6-10 RBCs and more than 50 WBCs per high-powered field.  A CTA was done to rule out pulmonary embolism there is no obvious pulmonary embolism there is small right pleural effusion bibasilar atelectasis and possible right lower lobe and right middle lobe pneumonia.  Additionally noted the pulmonary artery is dilated and there is hepatic steatosis just of of possible underlying cirrhosis.  The patient was initially  given Rocephin for suspected urinary tract infection then placed on vancomycin and Zosyn once patient was found to have pneumonia on the CT scan.  Patient was subsequently referred for admission.    10/30: No acute overnight events. Nurse reports decreased mental status upon arrival but has improved with oxygen. COVID, flu, RSV negative. XR of the foot demonstrates degenerative changes and diffuse osteopenia but no acute abnormalities.     Work on pulmonary toilet had discussion with the patient she is a former  from [facility].  She has been nonambulatory for a while and been Yayo dependent for the last year.  Unable to ambulate due to severe arthritis.    10/31: Diane slept throughout the night and is feeling great this AM. She is on 2L O2 and denies SOB. No abd pain, fever, chest pain, n/v, abnormal bowel movements. She would like to return to [facility] today. Her ABG is significant for a respiratory acidosis. Urine culture shows enteric bacilli.     11/1:   Patient complaining of restless leg syndrome at night will start Requip will discharge on Augmentin and short course of minocycline for pneumonia.  Clinically doing well enough to return back to [facility].      See after visit summary and goldenrod for complete plan.      35 minutes spent in the care of this patient.       Objective     Last Recorded Vitals  BP (!) 156/95 (BP Location: Left arm, Patient Position: Lying)   Pulse 100   Temp 36.3 °C (97.3 °F) (Temporal)   Resp 20   Wt 136 kg (300 lb)   SpO2 97%   Intake/Output last 3 Shifts:    Intake/Output Summary (Last 24 hours) at 11/1/2024 0611  Last data filed at 10/31/2024 1558  Gross per 24 hour   Intake 1210 ml   Output 450 ml   Net 760 ml       Admission Weight  Weight: 136 kg (300 lb) (10/29/24 0902)    Daily Weight  10/29/24 : 136 kg (300 lb)    Image Results  XR foot left 3+ views  Narrative: STUDY:  Foot Radiographs; 10/29/2024 2:03 PM  INDICATION:  Great toe pain.  Evaluate for  osteomyelitis.  COMPARISON:  None Available.  ACCESSION NUMBER(S):  [id number]  ORDERING CLINICIAN:  DAVE SUN  TECHNIQUE:  Three view(s) of the left foot.  FINDINGS:    There is no displaced fracture.  The alignment is anatomic.  No soft  tissue abnormality is seen. No discrete lytic or blastic lesion is  noted. There are diffuse degenerative changes. There is a small heel  spur.  Impression: No discrete acute osseous abnormality.  Extensive diffuse degenerative changes and diffuse osteopenia.  Signed by Brock Montalvo MD  CT angio chest for pulmonary embolism  Narrative: STUDY:  CT Angiogram of the Chest; 10/29/2024 11:18 AM  INDICATION:  Hypoxia, tachycardia.  COMPARISON:  None Available.  ACCESSION NUMBER(S):  [id number]  ORDERING CLINICIAN:  GENNARO ROMERO  TECHNIQUE:  CTA of the chest was performed with intravenous contrast.   Images are reviewed and processed at a workstation according to the CT  angiogram protocol with 3-D and/or MIP post processing imaging  generated.  Omnipaque 350--75 mL was administered intravenously.  Automated mA/kV exposure control was utilized and patient examination  was performed in strict accordance with principles of ALARA.  FINDINGS:  Pulmonary arteries are slightly suboptimally opacified due to late  contrast bolus, but no large or central pulmonary embolism.  The  thoracic aorta is normal in course and caliber without dissection or  aneurysm.  Main pulmonary artery is dilated measuring 3.8 cm.  The heart is normal in size without pericardial effusion.  Moderate  coronary artery calcifications.  Thoracic lymph nodes are not  enlarged.  And small right pleural effusion.  The airways are patent.  Bibasilar areas of atelectasis.  Question superimposed areas of  consolidation in the right lower lobe and possibly in the right middle  lobe.  There is fatty infiltration of the liver with slight nodular contour  the liver raising the possibility of underlying cirrhosis.    Gallbladder is present.  There are no acute fractures.  No suspicious bony lesions.  Impression: 1. Pulmonary arteries are slightly suboptimally opacified due to late  contrast bolus, but no large or central pulmonary embolism.  2. Small right pleural effusion.  3. Bibasilar areas of atelectasis. Question superimposed areas of  consolidation in the right lower lobe and possibly in the right middle  lobe. Correlate clinically for possible pneumonia.  4. Main pulmonary artery is dilated measuring 3.8 cm. Findings  suggestive of pulmonary arterial hypertension.  5. Hepatic steatosis with some morphologic changes raising the  possibility of underlying cirrhosis.  Signed by Shakir Hutchinson MD  ECG 12 lead  Sinus rhythm  Ventricular premature complex  Low voltage, precordial leads      EXAM:    Constitutional:    Head and facial: Patient with redundant tissue head neck wearing nasal cannula oxygen    Neck: Redundant tissue head neck    Lungs: Decreased breath sounds bases    Heart: Regular heart rate    Abdomen: Nontender    Pelvis/: No flank tenderness    Extremities: Patient with padding on heels bilaterally leg compressors on lower extremities some pretibial edema    Neurologic: Occasional myoclonic twitching  Bedbound due to deconditioning    Dermatologic: Heel pain probably early breakdown on heels dressings in place    Psychiatric/behavioral: Patient is pleasant and appropriate no evidence of confusion currently.        Relevant Results  Results from last 7 days   Lab Units 10/31/24  0530 10/30/24  0439 10/29/24  1008   WBC AUTO x10*3/uL 5.9 6.6 6.9   HEMOGLOBIN g/dL 10.9* 10.3* 12.3   HEMATOCRIT % 35.7* 33.1* 39.1   PLATELETS AUTO x10*3/uL 163 175 191      Results from last 7 days   Lab Units 10/31/24  0530 10/30/24  0439 10/29/24  1008   SODIUM mmol/L 139 141 140   POTASSIUM mmol/L 4.3 4.4 4.6   CHLORIDE mmol/L 96* 96* 95*   CO2 mmol/L 38* 39* 39*   BUN mg/dL 22 22 25*   CREATININE mg/dL 1.05 1.09* 1.10*    CALCIUM mg/dL 9.3 9.0 10.1   PROTEIN TOTAL g/dL  --   --  7.7   BILIRUBIN TOTAL mg/dL  --   --  0.9   ALK PHOS U/L  --   --  85   ALT U/L  --   --  11   AST U/L  --   --  16   GLUCOSE mg/dL 89 88 106*      Results from last 7 days   Lab Units 10/31/24  0530 10/29/24  1008   MAGNESIUM mg/dL 1.74 1.76       Susceptibility data from last 90 days.  Collected Specimen Info Organism   10/29/24 Blood culture from Peripheral Venipuncture Staphylococcus epidermidis   10/29/24 Urine from Clean Catch/Voided Enteric bacilli      Scheduled medications  amitriptyline, 25 mg, oral, Nightly  cefTRIAXone, 2 g, intravenous, q24h  folic acid, 1 mg, oral, Daily  furosemide, 20 mg, oral, Daily  [Held by provider] gabapentin, 400 mg, oral, TID  heparin (porcine), 7,500 Units, subcutaneous, q8h PALOMA  lidocaine, 1 patch, transdermal, Daily  lidocaine-epinephrine, 10 mL, injection, Once  metoprolol succinate XL, 50 mg, oral, Daily  oxygen, , inhalation, Continuous - Inhalation  pantoprazole, 40 mg, oral, Daily before breakfast   Or  pantoprazole, 40 mg, intravenous, Daily before breakfast  polyethylene glycol, 17 g, oral, Daily      Continuous medications     PRN medications  PRN medications: acetaminophen, bisacodyl, bisacodyl, guaiFENesin, hydrOXYzine HCL, ipratropium-albuteroL, melatonin, ondansetron **OR** ondansetron, oxygen              Assessment/Plan      Pneumonia of right middle lobe due to infectious organism  Acute on chronic  respiratory failure with hypoxia and hypercapnia (Multi)  Acute cystitis  Cellulitis of toe of left foot  Chronic diastolic heart failure  Hepatic steatosis on CT  Restless leg syndrome   strong family history of Parkinson's disease  hypoventilation syndrome  myoclonic twitching from above  above ideal BMI with associated complications  DVT Prophylaxis: SCD and heparin 7,500 subcutaneous q8hrs      okay to discharge home today see after visit summary and goldenrod for complete plan okay [facility] back to  [facility].      Will start Requip at night for restless leg syndrome.     35 minutes spent in the care of this patient.      DOMINIQUE GOFF    Student shared visit with student  Patient seen and examined  Note amended and addended  See my orders for complete plan

## 2024-11-01 NOTE — PROGRESS NOTES
Vancomycin Dosing by Pharmacy- Cessation of Therapy    Consult to pharmacy for vancomycin dosing has been discontinued by the prescriber, pharmacy will sign off at this time.    Please call pharmacy if there are further questions or re-enter a consult if vancomycin is resumed.     Rayne Scales, PharmD

## 2024-11-01 NOTE — NURSING NOTE
Order received for discharge to [facility]. Report called to staff at SNF. Iv d/cd. Waiting for PAS to transport pt to SNF.

## 2024-11-02 LAB — BACTERIA BLD CULT: NORMAL

## 2024-11-05 LAB
BACTERIA BLD AEROBE CULT: ABNORMAL
BACTERIA BLD CULT: ABNORMAL
GRAM STN SPEC: ABNORMAL

## 2024-11-09 NOTE — DOCUMENTATION CLARIFICATION NOTE
"    PATIENT:               MARIEL DESIR  ACCT #:                  [id number]  MRN:                       [id number]  :                       1950  ADMIT DATE:       10/29/2024 8:59 AM  DISCH DATE:        2024 3:50 PM  RESPONDING PROVIDER #:        [id number]          PROVIDER RESPONSE TEXT:    Sepsis with cardiac organ dysfunction of elevated troponin    CDI QUERY TEXT:    Clarification        Instruction:  Based on your assessment of the patient and the clinical information, please provide the requested documentation by clicking on the appropriate radio button and enter any additional information if prompted.    Question: Sepsis was documented in the medical record. Based on the documentation and the clinical information, can the diagnosis be further clarified as    When answering this query, please exercise your independent professional judgment. The fact that a question is being asked, does not imply that any particular answer is desired or expected.    The patient's clinical indicators include:  Clinical Information: 24 D/C note: \" Pneumonia of right middle lobe d/t infectious organism. Acute on chronic respiratory failure with hypoxia and hypercapnia. Acute cystitis.  Cellulitis of toe of left foot.  Chronic diastolic heart failure. Hepatic steatosis on CT.\"    Documented Diagnosis:  10/29/24 ED: \"Concern for sepsis at 1040 due to patient's UTI, tachycardia and tachypnea.....As patient recently stopped antibiotics 2 days ago outpatient failure for pneumonia will admit patient to the hospital for sepsis pneumonia and UTI. \"      Clinical Indicators:  -Vital Signs: On admit: T 97, hr .  RR 18-20. B/P 131//95.  -10/29/24 Pulmonary:  \"acute on chronic hypoxic/hypercapnic respiratory failure: on 2 L at baseline, was increased at  facility to 5 L today prior to presentation to ED for pulse ox of 79% -- VBG pH 7.35, pCO2 73\"  -WBC: on admit: 5.4  -Microbiology Results: Urine culture with E. " "Coli (10/29/24)  Blood culture with Staphylococcus epidermidis (10/30 PN:  documented likely contaminant.\"  -Band Neutrophil Count/percent Band Neutrophil: 4.79  -Lactic acid: 0.7  -Troponins: 36, 26  -BUN/Creat: 25/1.10  -Bilirubin: 0.9  -MAP:  -Amada Coma Scale: 15  -PAO2/FIO2:  -Procalcitonin:  -Platelets: 191  -Other clinical indicators: CRP 6.31    Treatment: IV Rocephin 2 gm every 24 hrs. .  IV Zosyn 4.5 gm x1, IV Vancocin 2000 mg/500 ml x1 then 1750 mg every 24 hrs. ,  IV ns 500 cc Fluid bolus. Oxygen. Augmentin.  Minocycline.    Risk Factors: Pneumonia. Acute cystitis.  Cellulitis of toe of left carlos  Options provided:  -- Sepsis with cardiac and respiratory organ dysfunction of elevated troponins and acute respiratory failure  -- Sepsis with cardiac organ dysfunction of elevated troponin  -- Sepsis with respiratory organ dysfunction of acute respiratory failure  -- Sepsis was a differential diagnosis and ruled out after study  -- Sepsis with other organ dysfunction, Please specify sepsis associated organ dysfunction below  -- Other - I will add my own diagnosis  -- Refer to Clinical Documentation Reviewer    Query created by: Simran Abraham on 11/8/2024 7:51 AM      Electronically signed by:  MARINA MALONEY MD 11/8/2024 10:27 PM          "

## 2024-11-10 LAB
ATRIAL RATE: 95 BPM
P AXIS: 57 DEGREES
PR INTERVAL: 190 MS
Q ONSET: 249 MS
QRS COUNT: 15 BEATS
QRS DURATION: 79 MS
QT INTERVAL: 342 MS
QTC CALCULATION(BAZETT): 430 MS
QTC FREDERICIA: 398 MS
R AXIS: 24 DEGREES
T AXIS: 64 DEGREES
T OFFSET: 420 MS
VENTRICULAR RATE: 95 BPM

## 2024-12-18 ENCOUNTER — APPOINTMENT (OUTPATIENT)
Dept: PULMONOLOGY | Facility: HOSPITAL | Age: 74
End: 2024-12-18
Payer: COMMERCIAL